# Patient Record
Sex: FEMALE | Race: BLACK OR AFRICAN AMERICAN | NOT HISPANIC OR LATINO | URBAN - METROPOLITAN AREA
[De-identification: names, ages, dates, MRNs, and addresses within clinical notes are randomized per-mention and may not be internally consistent; named-entity substitution may affect disease eponyms.]

---

## 2018-05-19 ENCOUNTER — INPATIENT (INPATIENT)
Facility: HOSPITAL | Age: 78
LOS: 2 days | Discharge: HOME | End: 2018-05-22
Attending: EMERGENCY MEDICINE | Admitting: EMERGENCY MEDICINE
Payer: MEDICARE

## 2018-05-19 VITALS
HEIGHT: 59 IN | SYSTOLIC BLOOD PRESSURE: 117 MMHG | WEIGHT: 119.93 LBS | OXYGEN SATURATION: 94 % | RESPIRATION RATE: 17 BRPM | HEART RATE: 73 BPM | TEMPERATURE: 98 F | DIASTOLIC BLOOD PRESSURE: 66 MMHG

## 2018-05-19 LAB
ALBUMIN SERPL ELPH-MCNC: 3.7 G/DL — SIGNIFICANT CHANGE UP (ref 3.3–5)
ALP SERPL-CCNC: 42 U/L — SIGNIFICANT CHANGE UP (ref 40–120)
ALT FLD-CCNC: 21 U/L — SIGNIFICANT CHANGE UP (ref 12–78)
ANION GAP SERPL CALC-SCNC: 11 MMOL/L — SIGNIFICANT CHANGE UP (ref 5–17)
APPEARANCE UR: CLEAR — SIGNIFICANT CHANGE UP
APTT BLD: 22.1 SEC — LOW (ref 27.5–37.4)
AST SERPL-CCNC: 17 U/L — SIGNIFICANT CHANGE UP (ref 15–37)
BASOPHILS # BLD AUTO: 0.07 K/UL — SIGNIFICANT CHANGE UP (ref 0–0.2)
BASOPHILS NFR BLD AUTO: 0.7 % — SIGNIFICANT CHANGE UP (ref 0–2)
BILIRUB SERPL-MCNC: 0.6 MG/DL — SIGNIFICANT CHANGE UP (ref 0.2–1.2)
BILIRUB UR-MCNC: NEGATIVE — SIGNIFICANT CHANGE UP
BUN SERPL-MCNC: 20 MG/DL — SIGNIFICANT CHANGE UP (ref 7–23)
CALCIUM SERPL-MCNC: 9.3 MG/DL — SIGNIFICANT CHANGE UP (ref 8.5–10.1)
CHLORIDE SERPL-SCNC: 102 MMOL/L — SIGNIFICANT CHANGE UP (ref 96–108)
CO2 SERPL-SCNC: 29 MMOL/L — SIGNIFICANT CHANGE UP (ref 22–31)
COLOR SPEC: YELLOW — SIGNIFICANT CHANGE UP
CREAT SERPL-MCNC: 0.98 MG/DL — SIGNIFICANT CHANGE UP (ref 0.5–1.3)
DIFF PNL FLD: (no result)
EOSINOPHIL # BLD AUTO: 0.1 K/UL — SIGNIFICANT CHANGE UP (ref 0–0.5)
EOSINOPHIL NFR BLD AUTO: 1 % — SIGNIFICANT CHANGE UP (ref 0–6)
GLUCOSE BLDC GLUCOMTR-MCNC: 135 MG/DL — HIGH (ref 70–99)
GLUCOSE SERPL-MCNC: 202 MG/DL — HIGH (ref 70–99)
GLUCOSE UR QL: NEGATIVE MG/DL — SIGNIFICANT CHANGE UP
HCT VFR BLD CALC: 42.7 % — SIGNIFICANT CHANGE UP (ref 34.5–45)
HGB BLD-MCNC: 13.9 G/DL — SIGNIFICANT CHANGE UP (ref 11.5–15.5)
IMM GRANULOCYTES NFR BLD AUTO: 0.7 % — SIGNIFICANT CHANGE UP (ref 0–1.5)
INR BLD: 0.99 RATIO — SIGNIFICANT CHANGE UP (ref 0.88–1.16)
KETONES UR-MCNC: NEGATIVE — SIGNIFICANT CHANGE UP
LEUKOCYTE ESTERASE UR-ACNC: (no result)
LYMPHOCYTES # BLD AUTO: 1.44 K/UL — SIGNIFICANT CHANGE UP (ref 1–3.3)
LYMPHOCYTES # BLD AUTO: 14.8 % — SIGNIFICANT CHANGE UP (ref 13–44)
MCHC RBC-ENTMCNC: 28.8 PG — SIGNIFICANT CHANGE UP (ref 27–34)
MCHC RBC-ENTMCNC: 32.6 GM/DL — SIGNIFICANT CHANGE UP (ref 32–36)
MCV RBC AUTO: 88.4 FL — SIGNIFICANT CHANGE UP (ref 80–100)
MONOCYTES # BLD AUTO: 0.5 K/UL — SIGNIFICANT CHANGE UP (ref 0–0.9)
MONOCYTES NFR BLD AUTO: 5.2 % — SIGNIFICANT CHANGE UP (ref 2–14)
NEUTROPHILS # BLD AUTO: 7.52 K/UL — HIGH (ref 1.8–7.4)
NEUTROPHILS NFR BLD AUTO: 77.6 % — HIGH (ref 43–77)
NITRITE UR-MCNC: NEGATIVE — SIGNIFICANT CHANGE UP
NRBC # BLD: 0 /100 WBCS — SIGNIFICANT CHANGE UP (ref 0–0)
PH UR: 6 — SIGNIFICANT CHANGE UP (ref 5–8)
PLATELET # BLD AUTO: 286 K/UL — SIGNIFICANT CHANGE UP (ref 150–400)
POTASSIUM SERPL-MCNC: 3.5 MMOL/L — SIGNIFICANT CHANGE UP (ref 3.5–5.3)
POTASSIUM SERPL-SCNC: 3.5 MMOL/L — SIGNIFICANT CHANGE UP (ref 3.5–5.3)
PROT SERPL-MCNC: 7.6 GM/DL — SIGNIFICANT CHANGE UP (ref 6–8.3)
PROT UR-MCNC: NEGATIVE MG/DL — SIGNIFICANT CHANGE UP
PROTHROM AB SERPL-ACNC: 10.7 SEC — SIGNIFICANT CHANGE UP (ref 9.8–12.7)
RBC # BLD: 4.83 M/UL — SIGNIFICANT CHANGE UP (ref 3.8–5.2)
RBC # FLD: 12.7 % — SIGNIFICANT CHANGE UP (ref 10.3–14.5)
SODIUM SERPL-SCNC: 142 MMOL/L — SIGNIFICANT CHANGE UP (ref 135–145)
SP GR SPEC: 1.02 — SIGNIFICANT CHANGE UP (ref 1.01–1.02)
TROPONIN I SERPL-MCNC: <0.015 NG/ML — SIGNIFICANT CHANGE UP (ref 0.01–0.04)
TROPONIN I SERPL-MCNC: <0.015 NG/ML — SIGNIFICANT CHANGE UP (ref 0.01–0.04)
UROBILINOGEN FLD QL: NEGATIVE MG/DL — SIGNIFICANT CHANGE UP
WBC # BLD: 9.7 K/UL — SIGNIFICANT CHANGE UP (ref 3.8–10.5)
WBC # FLD AUTO: 9.7 K/UL — SIGNIFICANT CHANGE UP (ref 3.8–10.5)

## 2018-05-19 PROCEDURE — 74176 CT ABD & PELVIS W/O CONTRAST: CPT | Mod: 26

## 2018-05-19 PROCEDURE — 99285 EMERGENCY DEPT VISIT HI MDM: CPT

## 2018-05-19 PROCEDURE — 93010 ELECTROCARDIOGRAM REPORT: CPT

## 2018-05-19 PROCEDURE — 71045 X-RAY EXAM CHEST 1 VIEW: CPT | Mod: 26

## 2018-05-19 RX ORDER — ACETAMINOPHEN 500 MG
650 TABLET ORAL EVERY 6 HOURS
Qty: 0 | Refills: 0 | Status: DISCONTINUED | OUTPATIENT
Start: 2018-05-19 | End: 2018-05-22

## 2018-05-19 RX ORDER — ASPIRIN/CALCIUM CARB/MAGNESIUM 324 MG
325 TABLET ORAL ONCE
Qty: 0 | Refills: 0 | Status: COMPLETED | OUTPATIENT
Start: 2018-05-19 | End: 2018-05-19

## 2018-05-19 RX ORDER — DEXTROSE 50 % IN WATER 50 %
12.5 SYRINGE (ML) INTRAVENOUS ONCE
Qty: 0 | Refills: 0 | Status: DISCONTINUED | OUTPATIENT
Start: 2018-05-19 | End: 2018-05-20

## 2018-05-19 RX ORDER — HEPARIN SODIUM 5000 [USP'U]/ML
5000 INJECTION INTRAVENOUS; SUBCUTANEOUS EVERY 8 HOURS
Qty: 0 | Refills: 0 | Status: DISCONTINUED | OUTPATIENT
Start: 2018-05-19 | End: 2018-05-22

## 2018-05-19 RX ORDER — SENNA PLUS 8.6 MG/1
2 TABLET ORAL AT BEDTIME
Qty: 0 | Refills: 0 | Status: DISCONTINUED | OUTPATIENT
Start: 2018-05-19 | End: 2018-05-19

## 2018-05-19 RX ORDER — DEXTROSE 50 % IN WATER 50 %
15 SYRINGE (ML) INTRAVENOUS ONCE
Qty: 0 | Refills: 0 | Status: DISCONTINUED | OUTPATIENT
Start: 2018-05-19 | End: 2018-05-20

## 2018-05-19 RX ORDER — GLUCAGON INJECTION, SOLUTION 0.5 MG/.1ML
1 INJECTION, SOLUTION SUBCUTANEOUS ONCE
Qty: 0 | Refills: 0 | Status: DISCONTINUED | OUTPATIENT
Start: 2018-05-19 | End: 2018-05-20

## 2018-05-19 RX ORDER — SODIUM CHLORIDE 9 MG/ML
1000 INJECTION, SOLUTION INTRAVENOUS
Qty: 0 | Refills: 0 | Status: DISCONTINUED | OUTPATIENT
Start: 2018-05-19 | End: 2018-05-20

## 2018-05-19 RX ORDER — POLYETHYLENE GLYCOL 3350 17 G/17G
17 POWDER, FOR SOLUTION ORAL DAILY
Qty: 0 | Refills: 0 | Status: DISCONTINUED | OUTPATIENT
Start: 2018-05-19 | End: 2018-05-22

## 2018-05-19 RX ORDER — SODIUM CHLORIDE 9 MG/ML
3 INJECTION INTRAMUSCULAR; INTRAVENOUS; SUBCUTANEOUS ONCE
Qty: 0 | Refills: 0 | Status: COMPLETED | OUTPATIENT
Start: 2018-05-19 | End: 2018-05-19

## 2018-05-19 RX ORDER — DEXTROSE 50 % IN WATER 50 %
25 SYRINGE (ML) INTRAVENOUS ONCE
Qty: 0 | Refills: 0 | Status: DISCONTINUED | OUTPATIENT
Start: 2018-05-19 | End: 2018-05-20

## 2018-05-19 RX ORDER — DOCUSATE SODIUM 100 MG
100 CAPSULE ORAL THREE TIMES A DAY
Qty: 0 | Refills: 0 | Status: DISCONTINUED | OUTPATIENT
Start: 2018-05-19 | End: 2018-05-19

## 2018-05-19 RX ORDER — SODIUM CHLORIDE 9 MG/ML
1500 INJECTION INTRAMUSCULAR; INTRAVENOUS; SUBCUTANEOUS
Qty: 0 | Refills: 0 | Status: DISCONTINUED | OUTPATIENT
Start: 2018-05-19 | End: 2018-05-20

## 2018-05-19 RX ORDER — ASPIRIN/CALCIUM CARB/MAGNESIUM 324 MG
81 TABLET ORAL DAILY
Qty: 0 | Refills: 0 | Status: DISCONTINUED | OUTPATIENT
Start: 2018-05-19 | End: 2018-05-22

## 2018-05-19 RX ADMIN — SODIUM CHLORIDE 3 MILLILITER(S): 9 INJECTION INTRAMUSCULAR; INTRAVENOUS; SUBCUTANEOUS at 16:43

## 2018-05-19 RX ADMIN — Medication 325 MILLIGRAM(S): at 16:50

## 2018-05-19 NOTE — ED ADULT NURSE NOTE - OBJECTIVE STATEMENT
presents to the ED with abd pain and near syncope at home. as per pt, she has abd pain, denies nausea, vomiting, fevers, chills. pt satin the chair because of the abd pain, dizziness and diaphoresis. denies chest pain, SOB,

## 2018-05-19 NOTE — H&P ADULT - NSHPPHYSICALEXAM_GEN_ALL_CORE
Vital Signs Last 24 Hrs  T(C): 36.6 (19 May 2018 16:07), Max: 36.6 (19 May 2018 16:07)  T(F): 97.9 (19 May 2018 16:07), Max: 97.9 (19 May 2018 16:07)  HR: 70 (19 May 2018 20:33) (70 - 74)  BP: 116/87 (19 May 2018 20:33) (115/59 - 117/66)  RR: 18 (19 May 2018 20:33) (16 - 18)  SpO2: 97% (19 May 2018 20:33) (94% - 97%)

## 2018-05-19 NOTE — ED PROVIDER NOTE - CARDIAC, MLM
Normal rate, regular rhythm.  Heart sounds S1, S2. Normal rate, regular rhythm.  Heart sounds S1, S2. No gallops or rubs.

## 2018-05-19 NOTE — H&P ADULT - NSHPOUTPATIENTPROVIDERS_GEN_ALL_CORE
PCP/Cardio: Pt reported that she goes to see  from [facility] on [address] but does not remember name

## 2018-05-19 NOTE — ED PROVIDER NOTE - PROGRESS NOTE DETAILS
Catrachito Sorto on behalf of Dr. Sae Gilmore: Updated patient on results and answered any questions or concerns.

## 2018-05-19 NOTE — ED PROVIDER NOTE - NS_ ATTENDINGSCRIBEDETAILS _ED_A_ED_FT
I Sae Gilmore MD saw and examined the patient. Scribe documented for me and under my supervision. I have modified the scribe's documentation where necessary to reflect my history, physical exam and other relevant documentations pertinent to the care of the patient.

## 2018-05-19 NOTE — ED PROVIDER NOTE - NEUROLOGICAL, MLM
Alert and oriented, no focal deficits, no motor or sensory deficits. Alert and oriented, no focal deficits, no motor or sensory deficits. NIH=0 GCS=15. CN 2-12 grossly intact. No finger to nose movement issues. Intact proprioception.

## 2018-05-19 NOTE — ED PROVIDER NOTE - CHPI ED SYMPTOMS POS
+diaphoresis, +lightheadedness, +near syncope/PALPITATIONS +diaphoresis, +lightheadedness, +near syncope, +abd pain/PALPITATIONS

## 2018-05-19 NOTE — H&P ADULT - NEUROLOGICAL DETAILS
deep reflexes intact/sensation intact/cranial nerves intact/normal strength/responds to verbal commands/alert and oriented x 3/responds to pain

## 2018-05-19 NOTE — ED PROVIDER NOTE - OBJECTIVE STATEMENT
79 y/o Female with a PMHx of HTN, HLD, DM type 2 presents to ED BIBEMS s/p near syncope. Pt states she ate salty pizza after which she walked to [employer] where she felt lightheaded, diaphoretic, and had to sit down. +Palpitations. Pt states she nearly syncopized, but did not lose consciousness. Pt then walked back to a [facility] center who then called EMS. No vaginal bleeding, no melena, no hematochezia, no hematuria, no rectal bleeding. No recent surgeries. No hx of CAD, CABG, stents, MI, CVA. 77 y/o Female with a PMHx of HTN, HLD, DM type 2 presents to ED BIBEMS s/p near syncope. Pt states she ate salty pizza after which she walked to [employer] where she felt lightheaded, diaphoretic, and had to sit down. +Palpitations. Pt also notes of +abd pain prior to episode. Pt states she nearly syncopized, but did not lose consciousness. Pt then walked back to a [facility] center who then called EMS. No vaginal bleeding, no melena, no hematochezia, no hematuria, no rectal bleeding. No recent surgeries. No hx of CAD, CABG, stents, MI, CVA. 79 y/o Female with a PMHx of HTN, HLD, DM type 2 presents to ED BIBEMS s/p near syncope. Pt states she ate salty pizza after which she walked to [employer] where she felt lightheaded, diaphoretic, and had to sit down. +Palpitations. Pt also notes of +abd pain prior to episode. Pt states she nearly synopsized, but did not lose consciousness. Pt then walked back to a [facility] center who then called EMS. No vaginal bleeding, no melena, no hematochezia, no hematuria, no rectal bleeding. No recent surgeries. No hx of CAD, CABG, stents, MI, CVA. No fever or neck stiffness.

## 2018-05-19 NOTE — ED PROVIDER NOTE - MUSCULOSKELETAL, MLM
Spine appears normal, range of motion is not limited, no muscle or joint tenderness Spine appears normal, range of motion is not limited, no muscle or joint tenderness. 5/5 strength on flexion and extension. No nuchal rigidity.

## 2018-05-19 NOTE — H&P ADULT - HISTORY OF PRESENT ILLNESS
77 yo F with PMH significant for HTN, HLD, DM2 presents to the ED c/o near syncope. Pt reported that she was at [employer] and started to feel dizzy, hot, diaphoretic so sits down on bench. Denies LOC. Denies chest pain, headache, palpitation or SOB. Denies tingling or numbness. Pt reported that she had Abdominal pain prior to near syncope which is chronic pain and she gets once or twice dull abdominal pain. Pt reported that abdominal pain is mostly related to constipation. Pt states that she had colonoscopy done last year and was reported normal as per patient. Pt reported that abdominal pain resolved now. Denies N/V/D. C/o constipation.     Pt denies any PMH of CAD, MI, CVA or TIA.     In the ED, BUN/Cr: 20/0.98, Trop 1: < 0.015  CTAP:  inguinal hernia containing a knuckle of nonobstructive small bowel. No hydronephrosis. No bowel obstruction or gross bowel wall thickening. Normal appendix. Colonic diverticulosis without definite evidence of diverticulitis.

## 2018-05-19 NOTE — ED PROVIDER NOTE - GASTROINTESTINAL, MLM
Abdomen soft, non-tender, no guarding. diffusely tender on palpation of abd. Abdomen soft, no guarding. diffusely tender on palpation of abd. Abdomen soft, no guarding. BS+/ 4 quadrants.

## 2018-05-19 NOTE — H&P ADULT - ASSESSMENT
79 yo F with PMH significant for HTN, HLD, DM2 presents to the ED c/o near syncope.    # Near Syncope: r/o ACS, 2/2 to Dehydration - Vasovagal syncope?  - Admit to Telemetry  - HAO panel  - Serial EKG  - Cardio consult  - IVF NS gentle hydration  - Orthostatic vitals  - Fall precaution  - Neuro check  - ASA daily    # CTAP: inguinal hernia containing a knuckle of nonobstructive small bowel/ Enterocele/ Colonic Diverticulosis/ Subcm mesenteric LN  - Abdominal pain resolved now, abdomen is benign on exam  - Laxatives prn  - Outpt f/u with PCP after discharge for further eval  - If abdominal pain recurs consider GI consult and further inpatient w/u    # HTN  - c/w Enalapril  - Hold on HCTZ - IVF NS ordered  - Monitor BP    # HLD  - Lipid panel  - not on meds at home    # DM2  - Fingerstick check  - HBA1C  - Pt reported that she does not take any  meds for DM2, controlled by diet mostly    # DVT Ppx  - HSQ    # Pt reported that she takes Enalapril-HCTZ at home for HTN but does not remember dose. Pt also reported that she take one white pill at home but does  not remember name. Verify meds in AM.     Case d/w  77 yo F with PMH significant for HTN, HLD, DM2 presents to the ED c/o near syncope.    # Pre Syncope: r/o ACS, 2/2 to Dehydration - Vasovagal syncope?  - Admit to Telemetry  - HAO panel  - Serial EKG  - Cardio consult  - IVF NS gentle hydration  - Orthostatic vitals  - Fall precaution  - Neuro check  - ASA daily    # CTAP: inguinal hernia containing a knuckle of nonobstructive small bowel/ Enterocele/ Colonic Diverticulosis/ Subcm mesenteric LN  - Abdominal pain resolved now, abdomen is benign on exam  - Laxatives prn  - Outpt f/u with PCP after discharge for further eval  - If abdominal pain recurs consider GI consult and further inpatient w/u    # HTN  - c/w Enalapril  - Hold on HCTZ - IVF NS ordered  - Monitor BP    # HLD  - Lipid panel  - not on meds at home    # DM2  - Fingerstick check  - HBA1C  - Pt reported that she does not take any  meds for DM2, controlled by diet mostly    # DVT Ppx  - HSQ    # Pt reported that she takes Enalapril-HCTZ at home for HTN but does not remember dose. Pt also reported that she take one white pill at home but does  not remember name. Verify meds in AM.     Case d/w  77 yo F with PMH significant for HTN, HLD, DM2 presents to the ED c/o near syncope.    # Pre Syncope: r/o ACS, 2/2 to Dehydration - Vasovagal syncope?  - Admit to Telemetry  - HAO panel  - Serial EKG  - Cardio consult  - IVF NS gentle hydration  - Orthostatic vitals  - Fall precaution  - Neuro check  - ASA daily    # CTAP: inguinal hernia containing a knuckle of nonobstructive small bowel/ Enterocele/ Colonic Diverticulosis/ Subcm mesenteric LN  - Abdominal pain resolved now, abdomen is benign on exam  - Laxatives prn  - Outpt f/u with PCP after discharge for further eval  - If abdominal pain recurs consider GI consult and further inpatient w/u    # HTN  - c/w Enalapril  - Hold on HCTZ - IVF NS ordered  - Monitor BP    # HLD  - Lipid panel  - not on meds at home    # DM2  - Fingerstick check  - HBA1C  - Pt reported that she does not take any  meds for DM2, controlled by diet mostly    # DVT Ppx  - HSQ    # Pt reported that she takes Enalapril-HCTZ at home for HTN but does not remember dose. Pt also reported that she take one white pill at home but does  not remember name. Verify meds in AM.     Case d/w Dr. Warren

## 2018-05-19 NOTE — H&P ADULT - MUSCULOSKELETAL
negative No joint pain, swelling or deformity; no limitation of movement details… detailed exam no joint erythema/no joint warmth

## 2018-05-19 NOTE — H&P ADULT - ATTENDING COMMENTS
79 yo F with PMH significant for HTN, HLD, DM2 presents to the ED c/o near syncope. Pt reported that she was at [employer] and started to feel dizzy, hot, diaphoretic so sits down on bench. Denies LOC. Denies chest pain, headache, palpitation or SOB. Denies tingling or numbness. Pt reported that she had Abdominal pain prior to near syncope which is chronic pain and she gets once or twice dull abdominal pain. Pt reported that abdominal pain is mostly related to constipation. Pt states that she had colonoscopy done last year and was reported normal as per patient. Pt reported that abdominal pain resolved now. Denies N/V/D. C/o constipation.   ROS: as above.  On exam: as above.  A/P:    # Pre Syncope: r/o ACS, 2/2 to Dehydration - Vasovagal syncope?  - Admit to Telemetry  - HAO panel  - Serial EKG  - Cardio consult  - IVF NS gentle hydration  - Orthostatic vitals  - Fall precaution  - Neuro check  - ASA daily    # CTAP: inguinal hernia containing a knuckle of nonobstructive small bowel/ Enterocele/ Colonic Diverticulosis/ Subcm mesenteric LN  - Abdominal pain resolved now, abdomen is benign on exam  - Laxatives prn  - Outpt f/u with PCP after discharge for further eval  - If abdominal pain recurs consider GI consult and further inpatient w/u    # HTN  - c/w Enalapril  - Hold on HCTZ - IVF NS ordered  - Monitor BP    # HLD  - Lipid panel  - not on meds at home    # DM2  - Fingerstick check  - HBA1C  - Pt reported that she does not take any  meds for DM2, controlled by diet mostly    # DVT Ppx  - HSQ    # Pt reported that she takes Enalapril-HCTZ at home for HTN but does not remember dose. Pt also reported that she take one white pill at home but does  not remember name. Verify meds in AM.

## 2018-05-20 LAB
ADD ON TEST-SPECIMEN IN LAB: SIGNIFICANT CHANGE UP
ANION GAP SERPL CALC-SCNC: 8 MMOL/L — SIGNIFICANT CHANGE UP (ref 5–17)
APTT BLD: 25.8 SEC — LOW (ref 27.5–37.4)
BACTERIA # UR AUTO: (no result)
BASOPHILS # BLD AUTO: 0.04 K/UL — SIGNIFICANT CHANGE UP (ref 0–0.2)
BASOPHILS NFR BLD AUTO: 0.6 % — SIGNIFICANT CHANGE UP (ref 0–2)
BUN SERPL-MCNC: 17 MG/DL — SIGNIFICANT CHANGE UP (ref 7–23)
CALCIUM SERPL-MCNC: 8.6 MG/DL — SIGNIFICANT CHANGE UP (ref 8.5–10.1)
CHLORIDE SERPL-SCNC: 104 MMOL/L — SIGNIFICANT CHANGE UP (ref 96–108)
CHOLEST SERPL-MCNC: 148 MG/DL — SIGNIFICANT CHANGE UP (ref 10–199)
CO2 SERPL-SCNC: 28 MMOL/L — SIGNIFICANT CHANGE UP (ref 22–31)
CREAT SERPL-MCNC: 0.67 MG/DL — SIGNIFICANT CHANGE UP (ref 0.5–1.3)
EOSINOPHIL # BLD AUTO: 0.17 K/UL — SIGNIFICANT CHANGE UP (ref 0–0.5)
EOSINOPHIL NFR BLD AUTO: 2.6 % — SIGNIFICANT CHANGE UP (ref 0–6)
EPI CELLS # UR: SIGNIFICANT CHANGE UP
GLUCOSE BLDC GLUCOMTR-MCNC: 117 MG/DL — HIGH (ref 70–99)
GLUCOSE BLDC GLUCOMTR-MCNC: 162 MG/DL — HIGH (ref 70–99)
GLUCOSE SERPL-MCNC: 129 MG/DL — HIGH (ref 70–99)
HBA1C BLD-MCNC: 5.9 % — HIGH (ref 4–5.6)
HCT VFR BLD CALC: 37.7 % — SIGNIFICANT CHANGE UP (ref 34.5–45)
HDLC SERPL-MCNC: 42 MG/DL — SIGNIFICANT CHANGE UP (ref 40–125)
HGB BLD-MCNC: 12 G/DL — SIGNIFICANT CHANGE UP (ref 11.5–15.5)
IMM GRANULOCYTES NFR BLD AUTO: 0.3 % — SIGNIFICANT CHANGE UP (ref 0–1.5)
INR BLD: 1.06 RATIO — SIGNIFICANT CHANGE UP (ref 0.88–1.16)
LIPID PNL WITH DIRECT LDL SERPL: 71 MG/DL — SIGNIFICANT CHANGE UP
LYMPHOCYTES # BLD AUTO: 1.92 K/UL — SIGNIFICANT CHANGE UP (ref 1–3.3)
LYMPHOCYTES # BLD AUTO: 29.6 % — SIGNIFICANT CHANGE UP (ref 13–44)
MAGNESIUM SERPL-MCNC: 2.1 MG/DL — SIGNIFICANT CHANGE UP (ref 1.6–2.6)
MCHC RBC-ENTMCNC: 27.8 PG — SIGNIFICANT CHANGE UP (ref 27–34)
MCHC RBC-ENTMCNC: 31.8 GM/DL — LOW (ref 32–36)
MCV RBC AUTO: 87.5 FL — SIGNIFICANT CHANGE UP (ref 80–100)
MONOCYTES # BLD AUTO: 0.52 K/UL — SIGNIFICANT CHANGE UP (ref 0–0.9)
MONOCYTES NFR BLD AUTO: 8 % — SIGNIFICANT CHANGE UP (ref 2–14)
NEUTROPHILS # BLD AUTO: 3.81 K/UL — SIGNIFICANT CHANGE UP (ref 1.8–7.4)
NEUTROPHILS NFR BLD AUTO: 58.9 % — SIGNIFICANT CHANGE UP (ref 43–77)
NRBC # BLD: 0 /100 WBCS — SIGNIFICANT CHANGE UP (ref 0–0)
PHOSPHATE SERPL-MCNC: 3.6 MG/DL — SIGNIFICANT CHANGE UP (ref 2.5–4.5)
PLATELET # BLD AUTO: 271 K/UL — SIGNIFICANT CHANGE UP (ref 150–400)
POTASSIUM SERPL-MCNC: 3.6 MMOL/L — SIGNIFICANT CHANGE UP (ref 3.5–5.3)
POTASSIUM SERPL-SCNC: 3.6 MMOL/L — SIGNIFICANT CHANGE UP (ref 3.5–5.3)
PROTHROM AB SERPL-ACNC: 11.5 SEC — SIGNIFICANT CHANGE UP (ref 9.8–12.7)
RBC # BLD: 4.31 M/UL — SIGNIFICANT CHANGE UP (ref 3.8–5.2)
RBC # FLD: 12.9 % — SIGNIFICANT CHANGE UP (ref 10.3–14.5)
RBC CASTS # UR COMP ASSIST: (no result) /HPF (ref 0–4)
SODIUM SERPL-SCNC: 140 MMOL/L — SIGNIFICANT CHANGE UP (ref 135–145)
TOTAL CHOLESTEROL/HDL RATIO MEASUREMENT: 3.5 RATIO — SIGNIFICANT CHANGE UP (ref 3.3–7.1)
TRIGL SERPL-MCNC: 176 MG/DL — HIGH (ref 10–149)
TSH SERPL-MCNC: 0.82 UIU/ML — SIGNIFICANT CHANGE UP (ref 0.36–3.74)
WBC # BLD: 6.48 K/UL — SIGNIFICANT CHANGE UP (ref 3.8–10.5)
WBC # FLD AUTO: 6.48 K/UL — SIGNIFICANT CHANGE UP (ref 3.8–10.5)
WBC UR QL: (no result)

## 2018-05-20 PROCEDURE — 93010 ELECTROCARDIOGRAM REPORT: CPT

## 2018-05-20 RX ORDER — SODIUM CHLORIDE 9 MG/ML
1000 INJECTION INTRAMUSCULAR; INTRAVENOUS; SUBCUTANEOUS
Qty: 0 | Refills: 0 | Status: DISCONTINUED | OUTPATIENT
Start: 2018-05-20 | End: 2018-05-22

## 2018-05-20 RX ORDER — ENALAPRIL MALEATE AND HYDROCHLOROTHIAZIDE 10; 25 MG/1; MG/1
0 TABLET ORAL
Qty: 0 | Refills: 0 | COMMUNITY

## 2018-05-20 RX ORDER — CEFTRIAXONE 500 MG/1
2000 INJECTION, POWDER, FOR SOLUTION INTRAMUSCULAR; INTRAVENOUS EVERY 24 HOURS
Qty: 0 | Refills: 0 | Status: DISCONTINUED | OUTPATIENT
Start: 2018-05-21 | End: 2018-05-22

## 2018-05-20 RX ORDER — CEFTRIAXONE 500 MG/1
2000 INJECTION, POWDER, FOR SOLUTION INTRAMUSCULAR; INTRAVENOUS ONCE
Qty: 0 | Refills: 0 | Status: COMPLETED | OUTPATIENT
Start: 2018-05-20 | End: 2018-05-20

## 2018-05-20 RX ORDER — CEFTRIAXONE 500 MG/1
INJECTION, POWDER, FOR SOLUTION INTRAMUSCULAR; INTRAVENOUS
Qty: 0 | Refills: 0 | Status: DISCONTINUED | OUTPATIENT
Start: 2018-05-20 | End: 2018-05-20

## 2018-05-20 RX ORDER — CEFTRIAXONE 500 MG/1
INJECTION, POWDER, FOR SOLUTION INTRAMUSCULAR; INTRAVENOUS
Qty: 0 | Refills: 0 | Status: DISCONTINUED | OUTPATIENT
Start: 2018-05-20 | End: 2018-05-22

## 2018-05-20 RX ADMIN — Medication 81 MILLIGRAM(S): at 12:34

## 2018-05-20 RX ADMIN — HEPARIN SODIUM 5000 UNIT(S): 5000 INJECTION INTRAVENOUS; SUBCUTANEOUS at 15:06

## 2018-05-20 RX ADMIN — HEPARIN SODIUM 5000 UNIT(S): 5000 INJECTION INTRAVENOUS; SUBCUTANEOUS at 21:56

## 2018-05-20 RX ADMIN — CEFTRIAXONE 2000 MILLIGRAM(S): 500 INJECTION, POWDER, FOR SOLUTION INTRAMUSCULAR; INTRAVENOUS at 15:06

## 2018-05-20 RX ADMIN — HEPARIN SODIUM 5000 UNIT(S): 5000 INJECTION INTRAVENOUS; SUBCUTANEOUS at 05:41

## 2018-05-20 RX ADMIN — SODIUM CHLORIDE 100 MILLILITER(S): 9 INJECTION INTRAMUSCULAR; INTRAVENOUS; SUBCUTANEOUS at 01:40

## 2018-05-20 RX ADMIN — Medication 1 TABLET(S): at 12:34

## 2018-05-20 RX ADMIN — SODIUM CHLORIDE 75 MILLILITER(S): 9 INJECTION INTRAMUSCULAR; INTRAVENOUS; SUBCUTANEOUS at 16:50

## 2018-05-20 NOTE — PROGRESS NOTE ADULT - ASSESSMENT
77 yo F with PMH significant for HTN, HLD, DM2 presents to the ED c/o near syncope.    # Pre Syncope ruled out ACS, due to Dehydration, vasovagal etiology   - Admit to Telemetry  - HAO panel x2 neg, repeat in am   - Serial EKG  - Cardio consult  - IVF NS gentle hydration  - Orthostatic vitals  - Fall precaution  - Neuro check  - ASA daily    # Abdominal pain due to UTI  # Chronic constipation  - CTAP: inguinal hernia containing a knuckle of nonobstructive small bowel/ Enterocele/ Colonic Diverticulosis/ Subcm mesenteric LN  - UA - pyuria, dysuria +  - start IV ceftriaxone   - f/u culture   - Laxatives prn  - Outpt f/u with PCP after discharge for further eval  - If abdominal pain recurs consider GI consult and further inpatient w/u    # HTN  - c/w Enalapril  - Hold on HCTZ - IVF NS ordered  - Monitor BP    # HLD  - Lipid panel LDL at goal  - not on meds at home    #h/o DM, diet controlled, in prediabetic range   - Fingerstick check - d/c  - HBA1C - 5.9   - Pt reported that she does not take any  meds for DM2, controlled by diet mostly    # DVT Ppx  - HSQ

## 2018-05-20 NOTE — PROGRESS NOTE ADULT - SUBJECTIVE AND OBJECTIVE BOX
Subjective:  Patient is a 78y old  Female who presents with a chief complaint of c/o Near syncope      HPI:     77 yo F with PMH significant for HTN, HLD, DM2 presents to the ED on 18  s/p near syncope. Pt reported that she was at [employer] and started to feel dizzy, hot, diaphoretic so sits down on bench. Denies LOC. Denies chest pain, headache, palpitation or SOB. Denies tingling or numbness. Pt reported that she had Abdominal pain prior to near syncope which is chronic pain and she gets once or twice dull abdominal pain. Pt reported that abdominal pain is mostly related to constipation. Pt states that she had colonoscopy done last year and was reported normal as per patient. Pt reported that abdominal pain resolved now. Denies N/V/D. C/o constipation.   Pt denies any PMH of CAD, MI, CVA or TIA.  In the ED, BUN/Cr: 20/0.98, Trop 1: < 0.015  CTAP:  inguinal hernia containing a knuckle of nonobstructive small bowel. No hydronephrosis. No bowel obstruction or gross bowel wall thickening. Normal appendix. Colonic diverticulosis without definite evidence of diverticulitis.      - Patient seen and examined at bedside earlier today, dizziness resolved, reports lower abd pain, dysuria, UA - pyuria noted, reports h/o  recent UTI treated with Abx, afebrile, denies HA, numbness, CP, no events overnight    Review of system- Rest of the review of system are negative except mentioned in HPI    OBJECTIVE:   T(C): 36.7 (18 @ 10:41), Max: 36.7 (18 @ 10:41)  T(F): 98.1 (18 @ 10:41), Max: 98.1 (18 @ 10:41)  HR: 67 (-18 @ 10:41) (62 - 74)  BP: 112/49 (-18 @ 10:41) (106/52 - 117/66)  RR: 16 (18 @ 10:41) (14 - 18)  SpO2: 97% (18 @ 10:41) (94% - 97%)  Wt(kg): --  Daily Height in cm: 149.86 (19 May 2018 16:07)    Daily Weight in k.8 (19 May 2018 23:42)    PHYSICAL EXAM:  GENERAL: NAD  NERVOUS SYSTEM:  Alert & Oriented X3, non- focal exam,  HEAD:  Atraumatic, Normocephalic  EYES: EOMI, PERRLA, conjunctiva and sclera clear  HEENT: Moist mucous membranes  NECK: Supple, No JVD  CHEST/LUNG: Clear to auscultation bilaterally; No rales, no rhonchi, no wheezing, or rubs  HEART: Regular rate and rhythm; No murmurs, rubs, or gallops  ABDOMEN: Soft, suprapubic tenderness,  Nondistended; Bowel sounds present, no guarding, no peritoneal signs  GENITOURINARY- Voiding, no suprapubic tenderness  EXTREMITIES:  2+ Peripheral Pulses, No clubbing, cyanosis, or edema  MUSCULOSKELETAL:- No muscle tenderness, Muscle tone normal, No joint tenderness, no Joint swelling, Joint range of motion-normal  SKIN-no rash, no lesion    LABS:                        12.0   6.48  )-----------( 271      ( 20 May 2018 06:37 )             37.7     05-20    140  |  104  |  17  ----------------------------<  129<H>  3.6   |  28  |  0.67    Ca    8.6      20 May 2018 06:37  Phos  3.6     05-20  Mg     2.1     05-20    TPro  7.6  /  Alb  3.7  /  TBili  0.6  /  DBili  x   /  AST  17  /  ALT  21  /  AlkPhos  42  05-19    PT/INR - ( 20 May 2018 06:37 )   PT: 11.5 sec;   INR: 1.06 ratio         PTT - ( 20 May 2018 06:37 )  PTT:25.8 sec  CARDIAC MARKERS ( 19 May 2018 22:05 )  <0.015 ng/mL / x     / x     / x     / x      CARDIAC MARKERS ( 19 May 2018 16:41 )  <0.015 ng/mL / x     / x     / x     / x      Urinalysis Basic - ( 19 May 2018 23:40 )    Color: Yellow / Appearance: Clear / S.020 / pH: x  Gluc: x / Ketone: Negative  / Bili: Negative / Urobili: Negative mg/dL   Blood: x / Protein: Negative mg/dL / Nitrite: Negative   Leuk Esterase: Moderate / RBC: 3-5 /HPF / WBC 26-50   Sq Epi: x / Non Sq Epi: Few / Bacteria: Few  CAPILLARY BLOOD GLUCOSE  POCT Blood Glucose.: 117 mg/dL (20 May 2018 12:08)  POCT Blood Glucose.: 162 mg/dL (20 May 2018 08:03)  POCT Blood Glucose.: 135 mg/dL (19 May 2018 23:12)  RECENT CULTURES:    RADIOLOGY & ADDITIONAL TESTS:  < from: CT Abdomen and Pelvis No Cont (18 @ 17:31) >  IMPRESSION:     Plan inguinal hernia containing a knuckle of nonobstructive small bowel  No hydronephrosis.  No bowel obstruction or gross bowel wall thickening. Normal appendix.   Colonic diverticulosis without definite evidence of diverticulitis.    < end of copied text >    < from: Xray Chest 1 View AP/PA. (18 @ 17:03) >  The heart is normal in size. Prominence of the bilateral paratracheal   stripes may represent an enlarged thyroid gland. Lungs are grossly clear.   The apices and hemidiaphragms are unremarkable. Degenerative changes of   the visualized osseous structures.  Impression:  No acute disease  < end of copied text >    MEDICATIONS  (STANDING):  aspirin enteric coated 81 milliGRAM(s) Oral daily  cefTRIAXone Injectable.      dextrose 5%. 1000 milliLiter(s) (50 mL/Hr) IV Continuous <Continuous>  dextrose 50% Injectable 12.5 Gram(s) IV Push once  dextrose 50% Injectable 25 Gram(s) IV Push once  dextrose 50% Injectable 25 Gram(s) IV Push once  heparin  Injectable 5000 Unit(s) SubCutaneous every 8 hours  multivitamin 1 Tablet(s) Oral daily  sodium chloride 0.9%. 1500 milliLiter(s) (100 mL/Hr) IV Continuous <Continuous>    MEDICATIONS  (PRN):  acetaminophen   Tablet 650 milliGRAM(s) Oral every 6 hours PRN pain and fever  dextrose 40% Gel 15 Gram(s) Oral once PRN Blood Glucose LESS THAN 70 milliGRAM(s)/deciliter  glucagon  Injectable 1 milliGRAM(s) IntraMuscular once PRN Glucose LESS THAN 70 milligrams/deciliter  polyethylene glycol 3350 17 Gram(s) Oral daily PRN Constipation

## 2018-05-21 LAB
ANION GAP SERPL CALC-SCNC: 12 MMOL/L — SIGNIFICANT CHANGE UP (ref 5–17)
BUN SERPL-MCNC: 20 MG/DL — SIGNIFICANT CHANGE UP (ref 7–23)
CALCIUM SERPL-MCNC: 8.1 MG/DL — LOW (ref 8.5–10.1)
CHLORIDE SERPL-SCNC: 109 MMOL/L — HIGH (ref 96–108)
CK SERPL-CCNC: 81 U/L — SIGNIFICANT CHANGE UP (ref 26–192)
CO2 SERPL-SCNC: 21 MMOL/L — LOW (ref 22–31)
CREAT SERPL-MCNC: 0.64 MG/DL — SIGNIFICANT CHANGE UP (ref 0.5–1.3)
GLUCOSE SERPL-MCNC: 122 MG/DL — HIGH (ref 70–99)
HCT VFR BLD CALC: 37.9 % — SIGNIFICANT CHANGE UP (ref 34.5–45)
HGB BLD-MCNC: 12.1 G/DL — SIGNIFICANT CHANGE UP (ref 11.5–15.5)
MCHC RBC-ENTMCNC: 28.1 PG — SIGNIFICANT CHANGE UP (ref 27–34)
MCHC RBC-ENTMCNC: 31.9 GM/DL — LOW (ref 32–36)
MCV RBC AUTO: 88.1 FL — SIGNIFICANT CHANGE UP (ref 80–100)
NRBC # BLD: 0 /100 WBCS — SIGNIFICANT CHANGE UP (ref 0–0)
NT-PROBNP SERPL-SCNC: 72 PG/ML — SIGNIFICANT CHANGE UP (ref 0–450)
PLATELET # BLD AUTO: 243 K/UL — SIGNIFICANT CHANGE UP (ref 150–400)
POTASSIUM SERPL-MCNC: 4.1 MMOL/L — SIGNIFICANT CHANGE UP (ref 3.5–5.3)
POTASSIUM SERPL-SCNC: 4.1 MMOL/L — SIGNIFICANT CHANGE UP (ref 3.5–5.3)
RBC # BLD: 4.3 M/UL — SIGNIFICANT CHANGE UP (ref 3.8–5.2)
RBC # FLD: 12.9 % — SIGNIFICANT CHANGE UP (ref 10.3–14.5)
SODIUM SERPL-SCNC: 142 MMOL/L — SIGNIFICANT CHANGE UP (ref 135–145)
TROPONIN I SERPL-MCNC: <0.015 NG/ML — SIGNIFICANT CHANGE UP (ref 0.01–0.04)
WBC # BLD: 5.19 K/UL — SIGNIFICANT CHANGE UP (ref 3.8–10.5)
WBC # FLD AUTO: 5.19 K/UL — SIGNIFICANT CHANGE UP (ref 3.8–10.5)

## 2018-05-21 PROCEDURE — 93010 ELECTROCARDIOGRAM REPORT: CPT

## 2018-05-21 RX ADMIN — HEPARIN SODIUM 5000 UNIT(S): 5000 INJECTION INTRAVENOUS; SUBCUTANEOUS at 05:29

## 2018-05-21 RX ADMIN — Medication 81 MILLIGRAM(S): at 11:56

## 2018-05-21 RX ADMIN — HEPARIN SODIUM 5000 UNIT(S): 5000 INJECTION INTRAVENOUS; SUBCUTANEOUS at 13:22

## 2018-05-21 RX ADMIN — CEFTRIAXONE 2000 MILLIGRAM(S): 500 INJECTION, POWDER, FOR SOLUTION INTRAMUSCULAR; INTRAVENOUS at 13:28

## 2018-05-21 RX ADMIN — HEPARIN SODIUM 5000 UNIT(S): 5000 INJECTION INTRAVENOUS; SUBCUTANEOUS at 22:06

## 2018-05-21 RX ADMIN — Medication 1 TABLET(S): at 11:55

## 2018-05-21 NOTE — CONSULT NOTE ADULT - SUBJECTIVE AND OBJECTIVE BOX
Chief complaint of c/o Near syncope (19 May 2018 22:00)    HPI:  78-year-old female admitted with complaints of dizziness and almost passing out. While standing in a [employer] patient felt dizzy, hot and diaphoretic and felt like as if she would pass out. No complains of palpitations. Denied chest pain. No shortness of breath. No loss of consciousness. She had had complains of abdominal pain prior to the episode. She's had chronic abdominal pains.      PAST MEDICAL & SURGICAL HISTORY:  HTN  HLD  DM  No significant past surgical history      PREVIOUS CARDIAC WORKUP:  None      ALLERGIES:    No Known Allergies       MEDICATIONS  (STANDING):  aspirin enteric coated 81 milliGRAM(s) Oral daily  cefTRIAXone Injectable.      cefTRIAXone Injectable. 2000 milliGRAM(s) IV Push every 24 hours  heparin  Injectable 5000 Unit(s) SubCutaneous every 8 hours  multivitamin 1 Tablet(s) Oral daily  sodium chloride 0.9%. 1000 milliLiter(s) (75 mL/Hr) IV Continuous <Continuous>    MEDICATIONS  (PRN):  acetaminophen   Tablet 650 milliGRAM(s) Oral every 6 hours PRN pain and fever  polyethylene glycol 3350 17 Gram(s) Oral daily PRN Constipation      FAMILY HISTORY:  No pertinent family history in first degree relatives        SOCIAL HISTORY:  Nonsmoker. No ETOH abuse. No illicit drugs.     ROS:     Detailed ten system ROS was performed and was negative except for history as eluded to above.    no fever  no chills  no nausea  no vomiting  no diarrhea  no constipation  no melena  no hematochezia  no chest pain  no palpitations  no sob at rest  no dyspnea on exertion  no cough  no wheezing  no anorexia  no headache  no dizziness - resolved  no syncope  no weakness  no myalgia  no dysuria  no polyuria  no hematuria     Vital Signs Last 24 Hrs  T(C): 36.3 (21 May 2018 04:13), Max: 37.1 (20 May 2018 16:43)  T(F): 97.4 (21 May 2018 04:13), Max: 98.8 (20 May 2018 16:43)  HR: 62 (21 May 2018 04:13) (62 - 78)  BP: 113/64 (21 May 2018 04:13) (112/49 - 139/63)  BP(mean): --  RR: 16 (20 May 2018 10:41) (16 - 16)  SpO2: 95% (21 May 2018 04:13) (94% - 97%)        PHYSICAL EXAM:    General:                Comfortable, AAO X 3, in no distress.  HEENT:                  Atraumatic, PERRLA, EOMI, conjunctiva clear.    Neck:                     Supple, no adenopathy, no thyromegaly, no JVD, no bruit.  Back:                     Symmetric, non tender.  Chest:                    Clear, B/L symmetric air entry, no tachypnea  Heart:                     S1, S2 normal, no gallop, no murmur.  Abdomen:              Soft, non-tender, bowel sounds active. No palpable masses.  Extremities:           no cyanosis, no edema. Peripheral pulses normal.  Skin:                      Skin color, texture normal. No rashes.  Neurologic:            Grossly nonfocal.       TELEMETRY:   Normal sinus rhythm with no tachy or seferino events     ECG:  NSR, no ST T changes of ischemia or infarction.       LABS:                          12.1   5.19  )-----------( 243      ( 21 May 2018 06:16 )             37.9     05-20    140  |  104  |  17  ----------------------------<  129<H>  3.6   |  28  |  0.67    Ca    8.6      20 May 2018 06:37  Phos  3.6     05-20  Mg     2.1     05-20    TPro  7.6  /  Alb  3.7  /  TBili  0.6  /  DBili  x   /  AST  17  /  ALT  21  /  AlkPhos  42  05-19    Lipid Panel  Chl 148  HDL 42  LDL 71  Trg 176      05-19 @ 22:05  Trop-I  <0.015    05-19 @ 16:41  Trop-I  <0.015      PT/INR - ( 20 May 2018 06:37 )   PT: 11.5 sec;   INR: 1.06 ratio    PTT - ( 20 May 2018 06:37 )  PTT:25.8 sec    Urinalysis: Few bacteria and 26-50 WBC      RADIOLOGY & ADDITIONAL STUDIES:    Xray Chest 1 View AP/PA. (05.19.18 @ 17:03) >   Findings: The heart is normal in size. Prominence of the bilateral paratracheal stripes may represent an enlarged thyroid gland. Lungs are grossly clear. The apices and hemidiaphragms are unremarkable. Degenerative changes of the visualized osseous structures.    CT Abdomen and Pelvis No Cont (05.19.18 @ 17:31) >    IMPRESSION: Right inguinal hernia containing a knuckle of nonobstructive small bowel. No hydronephrosis. No bowel obstruction or gross bowel wall thickening. Normal appendix. Colonic diverticulosis without definite evidence of diverticulitis.

## 2018-05-21 NOTE — CONSULT NOTE ADULT - ASSESSMENT
Near Syncope. Vasovagal, sec to abdominal pain  ? Dehydration  ? UTI  HTN  HLD  DM    Suggest:    Hydration  Tele is unremarkable for 24 hours. Out pt further cardiac work up Near Syncope. Vasovagal, sec to abdominal pain  ? Dehydration  ? UTI  HTN  HLD  DM    Suggest:    Hydration  D/C tele. Tele has been unremarkable for 24 hours.   Out pt further cardiac work up  ABx for possible UTI  Continue current treatment for HTN, HLD, DM  Advised pt to follow up with me as out pt.   I shall f/u PRN now.

## 2018-05-21 NOTE — PROGRESS NOTE ADULT - ATTENDING COMMENTS
Dispo - IV fluids, IV abx
Patient seen and examined with NP Dalila Alexander . I performed a history and physical examination of the patient and discussed patient’s management with the NP and spend 40 minutes face to face time.   I reviewed the NP’s note and agree with the documented findings and plan of care.

## 2018-05-21 NOTE — PROGRESS NOTE ADULT - ASSESSMENT
77 yo F with PMH significant for HTN, HLD, DM2 presented to the ED c/o near syncope.    # Pre Syncope ruled out ACS, due to Dehydration, vasovagal etiology   - she r/o ACS with negative cardiac biomarkers   - Cardio consult appreciated: no further cardiac work -up; dc tele  - IVF NS gentle hydration  - Orthostatic vitals - negative  -  con't ASA daily    # Abdominal pain due to UTI  # Chronic constipation  - CTAP: inguinal hernia containing a knuckle of nonobstructive small bowel/ Enterocele/ Colonic Diverticulosis/ Subcm mesenteric LN  - UA - pyuria, dysuria +  - con't  IV ceftriaxone until Ucx sensitivity results  - Laxatives prn  - Outpt f/u with PCP after discharge for further eval  - If abdominal pain recurs consider GI consult and further inpatient w/u    # HTN  - c/w Enalapril  - con't to hold HCTZ  - Monitor BP    # HLD  - Lipid panel LDL at goal  - not on meds at home    #h/o DM, diet controlled, in prediabetic range   - Fingerstick check - d/c  - HBA1C - 5.9   - Pt reported that she does not take any  meds for DM2, controlled by diet mostly    # DVT Ppx  - SQ heparin

## 2018-05-21 NOTE — PROGRESS NOTE ADULT - SUBJECTIVE AND OBJECTIVE BOX
Pt seen, chart reviewed  CC:  Near syncope      HPI: 79 yo F with PMH significant for HTN, HLD, DM2 presents to the ED on 18  s/p near syncope. As per chart review, pt reported that she was at [employer] and started to feel dizzy, hot, diaphoretic so sits down on bench. Denies LOC.     In the ED, BUN/Cr: 20/0.98, Trop 1: < 0.015  CTAP:  inguinal hernia containing a knuckle of nonobstructive small bowel. No hydronephrosis. No bowel obstruction or gross bowel wall thickening. Normal appendix. Colonic diverticulosis without definite evidence of diverticulitis.     HOSPITAL COURSE   -  dizziness resolved, reports lower abd pain, dysuria, UA - pyuria noted, reports h/o  recent UTI treated with Abx, afebrile, denies HA, numbness, CP, no events overnight  - not dizzy, still with hypogastric pain, no n/v/d/f/c; no CP/palpitations/HA/dizziness    Review of system- Rest of the review of system are negative except as mentioned above    Vital Signs Last 24 Hrs  T(C): 36.8 (21 May 2018 10:36), Max: 37.1 (20 May 2018 16:43)  T(F): 98.2 (21 May 2018 10:36), Max: 98.8 (20 May 2018 16:43)  HR: 71 (21 May 2018 10:36) (62 - 78)  BP: 126/60 (21 May 2018 10:36) (113/64 - 139/63)  BP(mean): --  RR: 18 (21 May 2018 10:36) (18 - 18)  SpO2: 97% (21 May 2018 10:36) (94% - 97%)    PHYSICAL EXAM:  GENERAL: NAD  NERVOUS SYSTEM:  Alert & Oriented X3, non- focal exam,  HEAD:  Atraumatic, Normocephalic  EYES: EOMI, PERRLA, conjunctiva and sclera clear  HEENT: Moist mucous membranes  NECK: Supple, No JVD  CHEST/LUNG: Clear to auscultation bilaterally; No rales, no rhonchi, no wheezing, or rubs  HEART: Regular rate and rhythm; No murmurs, rubs, or gallops  ABDOMEN: Soft, +suprapubic tenderness,  Nondistended; Bowel sounds present, no guarding, no peritoneal signs  GENITOURINARY- Voiding, no suprapubic tenderness  EXTREMITIES:  2+ Peripheral Pulses, No clubbing, cyanosis, or edema  MUSCULOSKELETAL:- No muscle tenderness, Muscle tone normal, No joint tenderness, no Joint swelling, Joint range of motion-normal  SKIN-no rash, no lesion    LABS:             12.1   5.19  )-----------( 243      ( 21 May 2018 06:16 )             37.9     05-21    142  |  109<H>  |  20  ----------------------------<  122<H>  4.1   |  21<L>  |  0.64    Ca    8.1<L>      21 May 2018 06:16  Phos  3.6     05-20  Mg     2.1     -20    TPro  7.6  /  Alb  3.7  /  TBili  0.6  /  DBili  x   /  AST  17  /  ALT  21  /  AlkPhos  42  05-19    CARDIAC MARKERS ( 21 May 2018 06:16 )  <0.015 ng/mL / x     / 81 U/L / x     / x      CARDIAC MARKERS ( 19 May 2018 22:05 )  <0.015 ng/mL / x     / x     / x     / x      CARDIAC MARKERS ( 19 May 2018 16:41 )  <0.015 ng/mL / x     / x     / x     / x        LIVER FUNCTIONS - ( 19 May 2018 16:41 )  Alb: 3.7 g/dL / Pro: 7.6 gm/dL / ALK PHOS: 42 U/L / ALT: 21 U/L / AST: 17 U/L / GGT: x           PT/INR - ( 20 May 2018 06:37 )   PT: 11.5 sec;   INR: 1.06 ratio       PTT - ( 20 May 2018 06:37 )  PTT:25.8 sec  Urinalysis Basic - ( 19 May 2018 23:40 )    Color: Yellow / Appearance: Clear / S.020 / pH: x  Gluc: x / Ketone: Negative  / Bili: Negative / Urobili: Negative mg/dL   Blood: x / Protein: Negative mg/dL / Nitrite: Negative   Leuk Esterase: Moderate / RBC: 3-5 /HPF / WBC 26-50   Sq Epi: x / Non Sq Epi: Few / Bacteria: Few    Serum Pro-Brain Natriuretic Peptide: 72 pg/mL (18 @ 06:16)    Culture - Urine (collected 18 @ 11:45)  Source: .Urine Clean Catch (Midstream)  Preliminary Report (18 @ 11:50):    >100,000 CFU/ml Escherichia coli    -20                        12.0   6.48  )-----------( 271      ( 20 May 2018 06:37 )             37.7         140  |  104  |  17  ----------------------------<  129<H>  3.6   |  28  |  0.67    Ca    8.6      20 May 2018 06:37  Phos  3.6       Mg     2.1         TPro  7.6  /  Alb  3.7  /  TBili  0.6  /  DBili  x   /  AST  17  /  ALT  21  /  AlkPhos  42      PT/INR - ( 20 May 2018 06:37 )   PT: 11.5 sec;   INR: 1.06 ratio         PTT - ( 20 May 2018 06:37 )  PTT:25.8 sec  CARDIAC MARKERS ( 19 May 2018 22:05 )  <0.015 ng/mL / x     / x     / x     / x      CARDIAC MARKERS ( 19 May 2018 16:41 )  <0.015 ng/mL / x     / x     / x     / x      Urinalysis Basic - ( 19 May 2018 23:40 )    Color: Yellow / Appearance: Clear / S.020 / pH: x  Gluc: x / Ketone: Negative  / Bili: Negative / Urobili: Negative mg/dL   Blood: x / Protein: Negative mg/dL / Nitrite: Negative   Leuk Esterase: Moderate / RBC: 3-5 /HPF / WBC 26-50   Sq Epi: x / Non Sq Epi: Few / Bacteria: Few  CAPILLARY BLOOD GLUCOSE  POCT Blood Glucose.: 117 mg/dL (20 May 2018 12:08)  POCT Blood Glucose.: 162 mg/dL (20 May 2018 08:03)  POCT Blood Glucose.: 135 mg/dL (19 May 2018 23:12)  RECENT CULTURES:    RADIOLOGY & ADDITIONAL TESTS:  < from: CT Abdomen and Pelvis No Cont (18 @ 17:31) >  IMPRESSION:     Plan inguinal hernia containing a knuckle of nonobstructive small bowel  No hydronephrosis.  No bowel obstruction or gross bowel wall thickening. Normal appendix.   Colonic diverticulosis without definite evidence of diverticulitis.    < end of copied text >    < from: Xray Chest 1 View AP/PA. (18 @ 17:03) >  The heart is normal in size. Prominence of the bilateral paratracheal   stripes may represent an enlarged thyroid gland. Lungs are grossly clear.   The apices and hemidiaphragms are unremarkable. Degenerative changes of   the visualized osseous structures.    Impression: No acute disease  < end of copied text >    MEDICATIONS  (STANDING):    aspirin enteric coated 81 milliGRAM(s) Oral daily  cefTRIAXone Injectable.      cefTRIAXone Injectable. 2000 milliGRAM(s) IV Push every 24 hours  heparin  Injectable 5000 Unit(s) SubCutaneous every 8 hours  multivitamin 1 Tablet(s) Oral daily  sodium chloride 0.9%. 1000 milliLiter(s) (75 mL/Hr) IV Continuous <Continuous>    MEDICATIONS  (PRN):  acetaminophen   Tablet 650 milliGRAM(s) Oral every 6 hours PRN pain and fever  polyethylene glycol 3350 17 Gram(s) Oral daily PRN Constipation

## 2018-05-22 VITALS
TEMPERATURE: 99 F | SYSTOLIC BLOOD PRESSURE: 151 MMHG | HEART RATE: 62 BPM | DIASTOLIC BLOOD PRESSURE: 72 MMHG | OXYGEN SATURATION: 95 % | RESPIRATION RATE: 18 BRPM

## 2018-05-22 LAB
-  AMIKACIN: SIGNIFICANT CHANGE UP
-  AMOXICILLIN/CLAVULANIC ACID: SIGNIFICANT CHANGE UP
-  AMPICILLIN/SULBACTAM: SIGNIFICANT CHANGE UP
-  AMPICILLIN: SIGNIFICANT CHANGE UP
-  AZTREONAM: SIGNIFICANT CHANGE UP
-  CEFAZOLIN: SIGNIFICANT CHANGE UP
-  CEFEPIME: SIGNIFICANT CHANGE UP
-  CEFOXITIN: SIGNIFICANT CHANGE UP
-  CEFTRIAXONE: SIGNIFICANT CHANGE UP
-  CIPROFLOXACIN: SIGNIFICANT CHANGE UP
-  ERTAPENEM: SIGNIFICANT CHANGE UP
-  GENTAMICIN: SIGNIFICANT CHANGE UP
-  IMIPENEM: SIGNIFICANT CHANGE UP
-  LEVOFLOXACIN: SIGNIFICANT CHANGE UP
-  MEROPENEM: SIGNIFICANT CHANGE UP
-  NITROFURANTOIN: SIGNIFICANT CHANGE UP
-  PIPERACILLIN/TAZOBACTAM: SIGNIFICANT CHANGE UP
-  TIGECYCLINE: SIGNIFICANT CHANGE UP
-  TOBRAMYCIN: SIGNIFICANT CHANGE UP
-  TRIMETHOPRIM/SULFAMETHOXAZOLE: SIGNIFICANT CHANGE UP
ANION GAP SERPL CALC-SCNC: 10 MMOL/L — SIGNIFICANT CHANGE UP (ref 5–17)
BUN SERPL-MCNC: 12 MG/DL — SIGNIFICANT CHANGE UP (ref 7–23)
CALCIUM SERPL-MCNC: 8.3 MG/DL — LOW (ref 8.5–10.1)
CHLORIDE SERPL-SCNC: 108 MMOL/L — SIGNIFICANT CHANGE UP (ref 96–108)
CO2 SERPL-SCNC: 24 MMOL/L — SIGNIFICANT CHANGE UP (ref 22–31)
CREAT SERPL-MCNC: 0.6 MG/DL — SIGNIFICANT CHANGE UP (ref 0.5–1.3)
CULTURE RESULTS: SIGNIFICANT CHANGE UP
GLUCOSE BLDC GLUCOMTR-MCNC: 123 MG/DL — HIGH (ref 70–99)
GLUCOSE BLDC GLUCOMTR-MCNC: 133 MG/DL — HIGH (ref 70–99)
GLUCOSE SERPL-MCNC: 125 MG/DL — HIGH (ref 70–99)
HCT VFR BLD CALC: 40.1 % — SIGNIFICANT CHANGE UP (ref 34.5–45)
HGB BLD-MCNC: 13 G/DL — SIGNIFICANT CHANGE UP (ref 11.5–15.5)
MAGNESIUM SERPL-MCNC: 2.4 MG/DL — SIGNIFICANT CHANGE UP (ref 1.6–2.6)
MCHC RBC-ENTMCNC: 28 PG — SIGNIFICANT CHANGE UP (ref 27–34)
MCHC RBC-ENTMCNC: 32.4 GM/DL — SIGNIFICANT CHANGE UP (ref 32–36)
MCV RBC AUTO: 86.4 FL — SIGNIFICANT CHANGE UP (ref 80–100)
METHOD TYPE: SIGNIFICANT CHANGE UP
NRBC # BLD: 0 /100 WBCS — SIGNIFICANT CHANGE UP (ref 0–0)
ORGANISM # SPEC MICROSCOPIC CNT: SIGNIFICANT CHANGE UP
ORGANISM # SPEC MICROSCOPIC CNT: SIGNIFICANT CHANGE UP
PLATELET # BLD AUTO: 278 K/UL — SIGNIFICANT CHANGE UP (ref 150–400)
POTASSIUM SERPL-MCNC: 4.1 MMOL/L — SIGNIFICANT CHANGE UP (ref 3.5–5.3)
POTASSIUM SERPL-SCNC: 4.1 MMOL/L — SIGNIFICANT CHANGE UP (ref 3.5–5.3)
RBC # BLD: 4.64 M/UL — SIGNIFICANT CHANGE UP (ref 3.8–5.2)
RBC # FLD: 12.9 % — SIGNIFICANT CHANGE UP (ref 10.3–14.5)
SODIUM SERPL-SCNC: 142 MMOL/L — SIGNIFICANT CHANGE UP (ref 135–145)
SPECIMEN SOURCE: SIGNIFICANT CHANGE UP
WBC # BLD: 6.96 K/UL — SIGNIFICANT CHANGE UP (ref 3.8–10.5)
WBC # FLD AUTO: 6.96 K/UL — SIGNIFICANT CHANGE UP (ref 3.8–10.5)

## 2018-05-22 RX ORDER — CEFUROXIME AXETIL 250 MG
1 TABLET ORAL
Qty: 3 | Refills: 0 | OUTPATIENT
Start: 2018-05-22 | End: 2018-05-22

## 2018-05-22 RX ORDER — POLYETHYLENE GLYCOL 3350 17 G/17G
17 POWDER, FOR SOLUTION ORAL
Qty: 0 | Refills: 0 | COMMUNITY
Start: 2018-05-22

## 2018-05-22 RX ORDER — ASPIRIN/CALCIUM CARB/MAGNESIUM 324 MG
1 TABLET ORAL
Qty: 30 | Refills: 0 | OUTPATIENT
Start: 2018-05-22 | End: 2018-06-20

## 2018-05-22 RX ORDER — ENALAPRIL MALEATE AND HYDROCHLOROTHIAZIDE 10; 25 MG/1; MG/1
1 TABLET ORAL
Qty: 0 | Refills: 0 | COMMUNITY

## 2018-05-22 RX ADMIN — Medication 1 TABLET(S): at 11:40

## 2018-05-22 RX ADMIN — CEFTRIAXONE 2000 MILLIGRAM(S): 500 INJECTION, POWDER, FOR SOLUTION INTRAMUSCULAR; INTRAVENOUS at 13:23

## 2018-05-22 RX ADMIN — Medication 81 MILLIGRAM(S): at 11:40

## 2018-05-22 RX ADMIN — HEPARIN SODIUM 5000 UNIT(S): 5000 INJECTION INTRAVENOUS; SUBCUTANEOUS at 05:12

## 2018-05-22 RX ADMIN — HEPARIN SODIUM 5000 UNIT(S): 5000 INJECTION INTRAVENOUS; SUBCUTANEOUS at 13:23

## 2018-05-22 NOTE — DISCHARGE NOTE ADULT - CARE PLAN
Principal Discharge DX:	Syncope, unspecified syncope type  Goal:	prevent recurrence  Assessment and plan of treatment:	follow up with cardiology within 1 week  Secondary Diagnosis:	DM (diabetes mellitus)  Assessment and plan of treatment:	diet, A1C 5.9 in pre-diabetic range  Secondary Diagnosis:	HTN (hypertension)  Assessment and plan of treatment:	stop BP medication, follow up with PCP within 1 week, blood pressure well controlled without medications  Secondary Diagnosis:	HLD (hyperlipidemia)  Assessment and plan of treatment:	continue low fat diet  Secondary Diagnosis:	UTI (urinary tract infection)  Assessment and plan of treatment:	complete antibiotics , follow up with PCP within 1 week

## 2018-05-22 NOTE — DISCHARGE NOTE ADULT - PATIENT PORTAL LINK FT
You can access the [facility] Portal, offered by [facility], by registering with the following website: [email or web address]

## 2018-05-22 NOTE — DISCHARGE NOTE ADULT - MEDICATION SUMMARY - MEDICATIONS TO CHANGE
No pertinent past medical history I will SWITCH the dose or number of times a day I take the medications listed below when I get home from the hospital:  None

## 2018-05-22 NOTE — DISCHARGE NOTE ADULT - HOSPITAL COURSE
CC:  Near syncope      HPI: 77 yo F with PMH significant for HTN, HLD, DM2 presents to the ED on 5/18/18  s/p near syncope. As per chart review, pt reported that she was at [employer] and started to feel dizzy, hot, diaphoretic so sits down on bench. Denies LOC.   In the ED, BUN/Cr: 20/0.98, Trop 1: < 0.015  CTAP:  inguinal hernia containing a knuckle of nonobstructive small bowel. No hydronephrosis. No bowel obstruction or gross bowel wall thickening. Normal appendix. Colonic diverticulosis without definite evidence of diverticulitis.   HOSPITAL COURSE  5/20 -  dizziness resolved, reports lower abd pain, dysuria, UA - pyuria noted, reports h/o  recent UTI treated with Abx, afebrile, denies HA, numbness, CP, no events overnight  5/21- not dizzy, still with hypogastric pain, no n/v/d/f/c; no CP/palpitations/HA/dizziness  5/22 - denies  chest pain, dizziness, + dysuria improving, afebrile  Review of system- Rest of the review of system are negative except as mentioned above  T(C): 36.6 (05-22-18 @ 10:40), Max: 36.8 (05-21-18 @ 17:04)  T(F): 97.9 (05-22-18 @ 10:40), Max: 98.2 (05-21-18 @ 17:04)  HR: 65 (05-22-18 @ 10:40) (52 - 65)  BP: 120/67 (05-22-18 @ 10:40) (114/51 - 132/62)  RR: 17 (05-22-18 @ 10:40) (17 - 19)  SpO2: 98% (05-22-18 @ 10:40) (98% - 100%)  Wt(kg): --  GENERAL: NAD  NERVOUS SYSTEM:  Alert & Oriented X3, non- focal exam,  HEAD:  Atraumatic, Normocephalic  EYES: EOMI, PERRLA, conjunctiva and sclera clear  HEENT: Moist mucous membranes  NECK: Supple, No JVD  CHEST/LUNG: Clear to auscultation bilaterally; No rales, no rhonchi, no wheezing, or rubs  HEART: Regular rate and rhythm; No murmurs, rubs, or gallops  ABDOMEN: Soft, +suprapubic tenderness,  Nondistended; Bowel sounds present, no guarding, no peritoneal signs  GENITOURINARY- Voiding, no suprapubic tenderness  EXTREMITIES:  2+ Peripheral Pulses, No clubbing, cyanosis, or edema  MUSCULOSKELETAL:- No muscle tenderness, Muscle tone normal, No joint tenderness, no Joint swelling, Joint range of motion-normal  77 yo F with PMH significant for HTN, HLD, DM2 presented to the ED c/o near syncope.    # Pre Syncope ruled out ACS, due to Dehydration, vasovagal etiology   - she r/o ACS with negative cardiac biomarkers   - Cardio consult appreciated: no further cardiac work -up; dc tele  - IVF NS gentle hydration  - Orthostatic vitals - negative  -  con't ASA daily  # Abdominal pain due to UTI due to E coli    # Chronic constipation  - CTAP: inguinal hernia containing a knuckle of nonobstructive small bowel/ Enterocele/ Colonic Diverticulosis/ Subcm mesenteric LN  - UA - pyuria, dysuria +  - con't  IV ceftriaxone--> ceftin  - Laxatives prn  - Outpt f/u with PCP after discharge for further eval  - If abdominal pain recurs consider GI consult and further inpatient w/u  # HTN  - well controlled, without medication   - d/c BP meds   # HLD  - Lipid panel LDL at goal  - not on meds at home  #h/o DM, diet controlled, in prediabetic range   - Fingerstick check - d/c  - HBA1C - 5.9   - Pt reported that she does not take any  meds for DM2, controlled by diet mostly  # DVT Ppx  - SQ heparin  Disposition - medically optimized to be discharged home with close follow up with PCP, cardiology within 1 week  complete antibiotics  return to ED if fever, abdominal pain, nausea, vomiting, chest pain, dyspnea  Discharge plan discussed with patient, RN  Patient advised to follow up with PCP within 3-7 days  time spend 40 min  left message to PCP , discharge note faxed to PCP

## 2018-05-22 NOTE — DISCHARGE NOTE ADULT - CARE PROVIDER_API CALL
Cody Jones), Family Medicine  [address] [place] [address]  Phone: [phone]  Fax: [phone]    Mami Apple), Cardiology; Interventional Cardiology  [address] [facility]  [place] [address]  Phone: [phone]  Fax: [phone]

## 2018-05-22 NOTE — DISCHARGE NOTE ADULT - MEDICATION SUMMARY - MEDICATIONS TO TAKE
I will START or STAY ON the medications listed below when I get home from the hospital:    aspirin 81 mg oral delayed release tablet  -- 1 tab(s) by mouth once a day  -- Indication: For SYNCOPE    cefuroxime 500 mg oral tablet  -- 1 tab(s) by mouth 2 times a day   -- Finish all this medication unless otherwise directed by prescriber.  Medication should be taken with plenty of water.  Take with food or milk.    -- Indication: For UTI    polyethylene glycol 3350 oral powder for reconstitution  -- 17 gram(s) by mouth once a day, As needed, Constipation  -- Indication: For constipation    Multiple Vitamins oral tablet  -- 1 tab(s) by mouth once a day  -- Indication: For vitamins I will START or STAY ON the medications listed below when I get home from the hospital:    aspirin 81 mg oral delayed release tablet  -- 1 tab(s) by mouth once a day  -- Indication: For SYNCOPE    cefuroxime 500 mg oral tablet  -- 1 tab(s) by mouth 2 times a day   -- Finish all this medication unless otherwise directed by prescriber.  Medication should be taken with plenty of water.  Take with food or milk.    -- Indication: For UTI (urinary tract infection)    polyethylene glycol 3350 oral powder for reconstitution  -- 17 gram(s) by mouth once a day, As needed, Constipation  -- Indication: For constipation    Multiple Vitamins oral tablet  -- 1 tab(s) by mouth once a day  -- Indication: For vitamin

## 2018-05-22 NOTE — DISCHARGE NOTE ADULT - PLAN OF CARE
prevent recurrence follow up with cardiology within 1 week diet, A1C 5.9 in pre-diabetic range stop BP medication, follow up with PCP within 1 week, blood pressure well controlled without medications continue low fat diet complete antibiotics , follow up with PCP within 1 week

## 2018-05-22 NOTE — DISCHARGE NOTE ADULT - MEDICATION SUMMARY - MEDICATIONS TO STOP TAKING
I will STOP taking the medications listed below when I get home from the hospital:    enalapril-hydrochlorothiazide    enalapril-hydrochlorothiazide 10 mg-25 mg oral tablet  -- 1 tab(s) by mouth once a day I will STOP taking the medications listed below when I get home from the hospital:  None

## 2018-05-22 NOTE — DISCHARGE NOTE ADULT - OTHER SIGNIFICANT FINDINGS
Complete Blood Count in AM (05.22.18 @ 05:42)    Nucleated RBC: 0 /100 WBCs    WBC Count: 6.96 K/uL    RBC Count: 4.64 M/uL    Hemoglobin: 13.0 g/dL    Hematocrit: 40.1 %    Mean Cell Volume: 86.4 fl    Mean Cell Hemoglobin: 28.0 pg    Mean Cell Hemoglobin Conc: 32.4 gm/dL    Red Cell Distrib Width: 12.9 %    Platelet Count - Automated: 278 K/uL    Basic Metabolic Panel in AM (05.22.18 @ 05:42)    Sodium, Serum: 142 mmol/L    Potassium, Serum: 4.1 mmol/L    Chloride, Serum: 108 mmol/L    Carbon Dioxide, Serum: 24 mmol/L    Anion Gap, Serum: 10 mmol/L    Blood Urea Nitrogen, Serum: 12 mg/dL    Creatinine, Serum: 0.60 mg/dL    Glucose, Serum: 125 mg/dL    Calcium, Total Serum: 8.3 mg/dL  Troponin I, Serum (05.21.18 @ 06:16)    Troponin I, Serum: <0.015: High Sensitivity Troponin and new reference  range effective 7/6/2016 ng/mL    Troponin I, Serum: <0.015: High Sensitivity Troponin and new reference  range effective 7/6/2016 ng/mL (05.19.18 @ 22:05)    Serum Pro-Brain Natriuretic Peptide (05.21.18 @ 06:16)    Serum Pro-Brain Natriuretic Peptide: 72 pg/mL    Culture - Urine (05.20.18 @ 11:45)    -  Amikacin: S <=8    -  Amoxicillin/Clavulanic Acid: S <=8/4    -  Ampicillin: R >16 These ampicillin results predict results for amoxicillin    -  Ampicillin/Sulbactam: I 16/8    -  Aztreonam: S <=4    -  Cefazolin: S <=2 This predicts results for oral agents cefaclor, cefdinir, cefpodoxime, cefprozil, cefuroxime axetil, cephalexin and locarbef for uncomplicated UTI. Note that some isolates may be susceptible to these agents while testing resistant to cefazolin.    -  Cefepime: S <=2    -  Cefoxitin: S <=4    -  Ceftriaxone: S <=1 Enterobacter, Citrobacter, and Serratia may develop resistance during prolonged therapy    -  Ciprofloxacin: R >2    -  Ertapenem: S <=0.5    -  Gentamicin: S <=1    -  Imipenem: S <=1    -  Levofloxacin: R >4    -  Meropenem: S <=1    -  Nitrofurantoin: S <=32 Should not be used to treat pyelonephritis    -  Piperacillin/Tazobactam: S <=8    -  Tigecycline: S <=1    -  Tobramycin: S <=2    -  Trimethoprim/Sulfamethoxazole: R >2/38    Specimen Source: .Urine Clean Catch (Midstream)    Culture Results:   >100,000 CFU/ml Escherichia coli    Organism Identification: Escherichia coli    Organism: Escherichia coli    Method Type: JORJE  < from: CT Abdomen and Pelvis No Cont (05.19.18 @ 17:31) >  IMPRESSION:     Plan inguinal hernia containing a knuckle of nonobstructive small bowel    No hydronephrosis.    No bowel obstruction or gross bowel wall thickening. Normal appendix.   Colonic diverticulosis without definite evidence of diverticulitis.    < end of copied text >  < from: Xray Chest 1 View AP/PA. (05.19.18 @ 17:03) >  The heart is normal in size. Prominence of the bilateral paratracheal   stripes may represent an enlarged thyroid gland. Lungs are grossly clear.   The apices and hemidiaphragms are unremarkable. Degenerative changes of   the visualized osseous structures.  Impression:  No acute disease  < end of copied text >

## 2018-05-24 DIAGNOSIS — R55 SYNCOPE AND COLLAPSE: ICD-10-CM

## 2018-05-24 DIAGNOSIS — K40.90 UNILATERAL INGUINAL HERNIA, WITHOUT OBSTRUCTION OR GANGRENE, NOT SPECIFIED AS RECURRENT: ICD-10-CM

## 2018-05-24 DIAGNOSIS — E11.9 TYPE 2 DIABETES MELLITUS WITHOUT COMPLICATIONS: ICD-10-CM

## 2018-05-24 DIAGNOSIS — K59.09 OTHER CONSTIPATION: ICD-10-CM

## 2018-05-24 DIAGNOSIS — I10 ESSENTIAL (PRIMARY) HYPERTENSION: ICD-10-CM

## 2018-05-24 DIAGNOSIS — N39.0 URINARY TRACT INFECTION, SITE NOT SPECIFIED: ICD-10-CM

## 2018-05-24 DIAGNOSIS — E86.0 DEHYDRATION: ICD-10-CM

## 2018-05-24 DIAGNOSIS — B96.20 UNSPECIFIED ESCHERICHIA COLI [E. COLI] AS THE CAUSE OF DISEASES CLASSIFIED ELSEWHERE: ICD-10-CM

## 2018-05-24 DIAGNOSIS — E78.5 HYPERLIPIDEMIA, UNSPECIFIED: ICD-10-CM

## 2018-07-21 ENCOUNTER — EMERGENCY (EMERGENCY)
Facility: HOSPITAL | Age: 78
LOS: 0 days | Discharge: HOME | End: 2018-07-21
Attending: EMERGENCY MEDICINE | Admitting: EMERGENCY MEDICINE
Payer: MEDICARE

## 2018-07-21 VITALS
TEMPERATURE: 98 F | OXYGEN SATURATION: 95 % | DIASTOLIC BLOOD PRESSURE: 61 MMHG | RESPIRATION RATE: 16 BRPM | SYSTOLIC BLOOD PRESSURE: 121 MMHG | HEART RATE: 90 BPM

## 2018-07-21 VITALS — HEIGHT: 65 IN | WEIGHT: 123.9 LBS

## 2018-07-21 DIAGNOSIS — E11.9 TYPE 2 DIABETES MELLITUS WITHOUT COMPLICATIONS: ICD-10-CM

## 2018-07-21 DIAGNOSIS — Z79.82 LONG TERM (CURRENT) USE OF ASPIRIN: ICD-10-CM

## 2018-07-21 DIAGNOSIS — I10 ESSENTIAL (PRIMARY) HYPERTENSION: ICD-10-CM

## 2018-07-21 DIAGNOSIS — R05 COUGH: ICD-10-CM

## 2018-07-21 DIAGNOSIS — J18.9 PNEUMONIA, UNSPECIFIED ORGANISM: ICD-10-CM

## 2018-07-21 PROCEDURE — 99283 EMERGENCY DEPT VISIT LOW MDM: CPT

## 2018-07-21 PROCEDURE — 71045 X-RAY EXAM CHEST 1 VIEW: CPT | Mod: 26

## 2018-07-21 RX ORDER — AZITHROMYCIN 500 MG/1
1 TABLET, FILM COATED ORAL
Qty: 4 | Refills: 0 | OUTPATIENT
Start: 2018-07-21 | End: 2018-07-24

## 2018-07-21 RX ORDER — AZITHROMYCIN 500 MG/1
500 TABLET, FILM COATED ORAL ONCE
Qty: 0 | Refills: 0 | Status: COMPLETED | OUTPATIENT
Start: 2018-07-21 | End: 2018-07-21

## 2018-07-21 RX ADMIN — AZITHROMYCIN 500 MILLIGRAM(S): 500 TABLET, FILM COATED ORAL at 23:07

## 2018-07-21 NOTE — ED ADULT TRIAGE NOTE - CHIEF COMPLAINT QUOTE
Pt presents to ER c/o productive cough x 2 weeks. Denies fever/cough. Normal breathing pattern with no difficulty

## 2018-07-21 NOTE — ED PROVIDER NOTE - MEDICAL DECISION MAKING DETAILS
Afebrile, normal VS.  CXR with RLL opacity which appears new.  Will treat with cough meds, and antibiotics.  F/u with PCP.

## 2018-07-21 NOTE — ED PROVIDER NOTE - OBJECTIVE STATEMENT
77 yo F hx of HTN, DMII, osteoporosis, presents with CC cough.  Symptoms started Sunday.  C/o productive cough with yellow sputum.  Denies fever, chills, hemoptysis, CP, SOB, leg swelling, or any other symptoms.  No meds taken for cough at home.  No sick contacts.  No other concerns.

## 2018-07-21 NOTE — ED ADULT NURSE NOTE - OBJECTIVE STATEMENT
78 year old female with a past medical history of hypertension, hyperlipidemia, and DM on Metformin presenting to the ED via EMS for a cough. Patient reports that symptoms are moderate. Patient states she has had a cough for the past week, describes it as productive, intermittent, with yellow sputum production. Patient states that she is unable to sleep due to cough, reports it is worse tonight.  Patient denies other complaints, is alert, oriented, ambulatory, and conversive without distress or SOB.   Negative: Syncope/LOC, fevers, chills, headache, dizziness, weakness, lethargy, vision changes, hearing changes, focal/lateralizing neurologic deficits, throat pain, chest pain, palpitations, shortness of breath, wheezing, abdominal pain, nausea, vomiting, constipation, diarrhea, urinary signs/symptoms, or edema.   Upon physical examination patient is A+Ox4/GCS 15 and conversive. PERRLA. S1S2 heard. Lung sounds rhonchi Abdomen soft/non-tender. FROM/CMS throughout all extremities.

## 2018-07-21 NOTE — ED ADULT NURSE NOTE - NS ED NURSE DC INFO COMPLEXITY
Returned Demonstration/Moderate: Comprehensive teaching/Patient asked questions/Verbalized Understanding

## 2018-07-22 PROBLEM — E11.9 TYPE 2 DIABETES MELLITUS WITHOUT COMPLICATIONS: Chronic | Status: ACTIVE | Noted: 2018-05-24

## 2018-07-22 PROBLEM — I10 ESSENTIAL (PRIMARY) HYPERTENSION: Chronic | Status: ACTIVE | Noted: 2018-05-24

## 2018-07-22 PROBLEM — E78.5 HYPERLIPIDEMIA, UNSPECIFIED: Chronic | Status: ACTIVE | Noted: 2018-05-24

## 2020-01-09 NOTE — ED PROVIDER NOTE - ALLERGIC/IMMUNOLOGIC NEGATIVE STATEMENT, MLM
no dermatitis, no environmental allergies, no food allergies, no immunosuppressive disorder, and no pruritus.
not applicable

## 2024-09-18 ENCOUNTER — OFFICE (OUTPATIENT)
Dept: URBAN - METROPOLITAN AREA CLINIC 110 | Facility: CLINIC | Age: 84
Setting detail: OPHTHALMOLOGY
End: 2024-09-18
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
## 2024-09-18 PROCEDURE — NO SHOW FE NO SHOW FEE: Performed by: STUDENT IN AN ORGANIZED HEALTH CARE EDUCATION/TRAINING PROGRAM

## 2024-09-19 ENCOUNTER — OFFICE (OUTPATIENT)
Dept: URBAN - METROPOLITAN AREA CLINIC 63 | Facility: CLINIC | Age: 84
Setting detail: OPHTHALMOLOGY
End: 2024-09-19
Payer: MEDICARE

## 2024-09-19 DIAGNOSIS — H02.014: ICD-10-CM

## 2024-09-19 DIAGNOSIS — H26.492: ICD-10-CM

## 2024-09-19 DIAGNOSIS — H35.372: ICD-10-CM

## 2024-09-19 DIAGNOSIS — H16.223: ICD-10-CM

## 2024-09-19 DIAGNOSIS — H02.011: ICD-10-CM

## 2024-09-19 DIAGNOSIS — H02.834: ICD-10-CM

## 2024-09-19 DIAGNOSIS — H52.4: ICD-10-CM

## 2024-09-19 DIAGNOSIS — H02.831: ICD-10-CM

## 2024-09-19 DIAGNOSIS — Z96.1: ICD-10-CM

## 2024-09-19 PROCEDURE — 92134 CPTRZ OPH DX IMG PST SGM RTA: CPT | Performed by: STUDENT IN AN ORGANIZED HEALTH CARE EDUCATION/TRAINING PROGRAM

## 2024-09-19 PROCEDURE — 92014 COMPRE OPH EXAM EST PT 1/>: CPT | Performed by: STUDENT IN AN ORGANIZED HEALTH CARE EDUCATION/TRAINING PROGRAM

## 2024-09-19 PROCEDURE — 92015 DETERMINE REFRACTIVE STATE: CPT | Performed by: STUDENT IN AN ORGANIZED HEALTH CARE EDUCATION/TRAINING PROGRAM

## 2024-09-19 ASSESSMENT — LID EXAM ASSESSMENTS
OS_TRICHIASIS: LUL 1+
OD_TRICHIASIS: RUL 1+

## 2024-09-19 ASSESSMENT — CONFRONTATIONAL VISUAL FIELD TEST (CVF)
OS_FINDINGS: FULL
OD_FINDINGS: FULL

## 2024-09-19 ASSESSMENT — LID POSITION - PTOSIS
OS_PTOSIS: LUL 2+
OD_PTOSIS: RUL 2+

## 2024-09-19 ASSESSMENT — LID POSITION - DERMATOCHALASIS
OS_DERMATOCHALASIS: LUL 3+
OD_DERMATOCHALASIS: RUL 3+

## 2024-10-10 ENCOUNTER — OFFICE (OUTPATIENT)
Dept: URBAN - METROPOLITAN AREA CLINIC 63 | Facility: CLINIC | Age: 84
Setting detail: OPHTHALMOLOGY
End: 2024-10-10
Payer: MEDICARE

## 2024-10-10 DIAGNOSIS — H02.521: ICD-10-CM

## 2024-10-10 DIAGNOSIS — H02.423: ICD-10-CM

## 2024-10-10 DIAGNOSIS — H02.524: ICD-10-CM

## 2024-10-10 DIAGNOSIS — H02.831: ICD-10-CM

## 2024-10-10 PROBLEM — H02.421 PTOSIS MYOGENIC; RIGHT EYE, LEFT EYE: Status: ACTIVE | Noted: 2024-10-10

## 2024-10-10 PROBLEM — H02.422 PTOSIS MYOGENIC; RIGHT EYE, LEFT EYE: Status: ACTIVE | Noted: 2024-10-10

## 2024-10-10 PROCEDURE — 92083 EXTENDED VISUAL FIELD XM: CPT | Performed by: OPHTHALMOLOGY

## 2024-10-10 PROCEDURE — 92285 EXTERNAL OCULAR PHOTOGRAPHY: CPT | Performed by: OPHTHALMOLOGY

## 2024-10-10 PROCEDURE — 99213 OFFICE O/P EST LOW 20 MIN: CPT | Performed by: OPHTHALMOLOGY

## 2024-10-10 ASSESSMENT — REFRACTION_CURRENTRX
OS_VPRISM_DIRECTION: SV
OS_SPHERE: +3.50
OD_CYLINDER: -0.75
OD_VPRISM_DIRECTION: SV
OS_AXIS: 111
OS_CYLINDER: -0.75
OD_AXIS: 135
OS_OVR_VA: 20/
OD_SPHERE: +3.50
OD_OVR_VA: 20/

## 2024-10-10 ASSESSMENT — REFRACTION_MANIFEST
OS_AXIS: 125
OS_ADD: +2.50
OS_CYLINDER: -1.00
OD_AXIS: 145
OS_SPHERE: +1.50
OD_CYLINDER: -0.75
OU_VA: 20/25
OD_SPHERE: +1.50
OD_VA1: 20/25
OS_VA1: 20/25
OD_ADD: +2.50

## 2024-10-10 ASSESSMENT — REFRACTION_AUTOREFRACTION
OS_AXIS: 141
OD_AXIS: 144
OD_SPHERE: +1.00
OD_CYLINDER: -0.50
OS_CYLINDER: -0.75
OS_SPHERE: +0.50

## 2024-10-10 ASSESSMENT — KERATOMETRY
OS_K2POWER_DIOPTERS: 44.75
OD_AXISANGLE_DEGREES: 058
OS_AXISANGLE_DEGREES: 062
OD_K2POWER_DIOPTERS: 44.25
OS_K1POWER_DIOPTERS: 44.25
OD_K1POWER_DIOPTERS: 43.25

## 2024-10-10 ASSESSMENT — SUPERFICIAL PUNCTATE KERATITIS (SPK)
OS_SPK: 2+
OD_SPK: 2+

## 2024-10-10 ASSESSMENT — CONFRONTATIONAL VISUAL FIELD TEST (CVF)
OS_FINDINGS: FULL
OD_FINDINGS: FULL

## 2024-10-10 ASSESSMENT — TONOMETRY
OS_IOP_MMHG: 15
OD_IOP_MMHG: 15

## 2024-10-10 ASSESSMENT — VISUAL ACUITY
OS_BCVA: 20/30-1
OD_BCVA: 20/40-1

## 2024-10-10 ASSESSMENT — LID EXAM ASSESSMENTS
OD_TRICHIASIS: RUL 1+
OS_TRICHIASIS: LUL 1+

## 2024-11-04 ENCOUNTER — ASC (OUTPATIENT)
Dept: URBAN - METROPOLITAN AREA SURGERY 8 | Facility: SURGERY | Age: 84
Setting detail: OPHTHALMOLOGY
End: 2024-11-04
Payer: MEDICARE

## 2024-11-04 DIAGNOSIS — H02.421: ICD-10-CM

## 2024-11-04 DIAGNOSIS — H04.422: ICD-10-CM

## 2024-11-04 DIAGNOSIS — H02.524: ICD-10-CM

## 2024-11-04 DIAGNOSIS — H02.521: ICD-10-CM

## 2024-11-04 PROCEDURE — 67900 REPAIR BROW DEFECT: CPT | Mod: 50 | Performed by: OPHTHALMOLOGY

## 2024-11-04 PROCEDURE — 67904 REPAIR EYELID DEFECT: CPT | Mod: 50 | Performed by: OPHTHALMOLOGY

## 2024-11-05 ENCOUNTER — RX ONLY (RX ONLY)
Age: 84
End: 2024-11-05

## 2024-11-05 ENCOUNTER — OFFICE (OUTPATIENT)
Dept: URBAN - METROPOLITAN AREA CLINIC 116 | Facility: CLINIC | Age: 84
Setting detail: OPHTHALMOLOGY
End: 2024-11-05
Payer: MEDICARE

## 2024-11-05 DIAGNOSIS — H02.422: ICD-10-CM

## 2024-11-05 DIAGNOSIS — H02.831: ICD-10-CM

## 2024-11-05 DIAGNOSIS — H02.834: ICD-10-CM

## 2024-11-05 DIAGNOSIS — H02.421: ICD-10-CM

## 2024-11-05 DIAGNOSIS — H02.521: ICD-10-CM

## 2024-11-05 DIAGNOSIS — H02.524: ICD-10-CM

## 2024-11-05 PROCEDURE — 99024 POSTOP FOLLOW-UP VISIT: CPT | Performed by: PHYSICIAN ASSISTANT

## 2024-11-05 ASSESSMENT — REFRACTION_MANIFEST
OD_VA1: 20/25
OD_SPHERE: +1.50
OU_VA: 20/25
OS_AXIS: 125
OD_CYLINDER: -0.75
OS_ADD: +2.50
OS_VA1: 20/25
OS_CYLINDER: -1.00
OD_ADD: +2.50
OD_AXIS: 145
OS_SPHERE: +1.50

## 2024-11-05 ASSESSMENT — SUPERFICIAL PUNCTATE KERATITIS (SPK)
OS_SPK: 2+
OD_SPK: 2+

## 2024-11-05 ASSESSMENT — VISUAL ACUITY
OD_BCVA: 20/60
OS_BCVA: 20/60

## 2024-11-05 ASSESSMENT — REFRACTION_AUTOREFRACTION
OS_SPHERE: +0.50
OS_AXIS: 141
OS_CYLINDER: -0.75
OD_AXIS: 144
OD_CYLINDER: -0.50
OD_SPHERE: +1.00

## 2024-11-05 ASSESSMENT — REFRACTION_CURRENTRX
OD_CYLINDER: -0.75
OD_VPRISM_DIRECTION: SV
OD_AXIS: 135
OS_CYLINDER: -0.75
OS_OVR_VA: 20/
OS_VPRISM_DIRECTION: SV
OS_SPHERE: +3.50
OD_SPHERE: +3.50
OD_OVR_VA: 20/
OS_AXIS: 111

## 2024-11-05 ASSESSMENT — KERATOMETRY
OS_AXISANGLE_DEGREES: 062
OD_K2POWER_DIOPTERS: 44.25
OS_K1POWER_DIOPTERS: 44.25
OD_K1POWER_DIOPTERS: 43.25
OD_AXISANGLE_DEGREES: 058
OS_K2POWER_DIOPTERS: 44.75

## 2024-11-05 ASSESSMENT — LID EXAM ASSESSMENTS
OD_TRICHIASIS: RUL 1+
OS_TRICHIASIS: LUL 1+

## 2024-11-05 ASSESSMENT — CONFRONTATIONAL VISUAL FIELD TEST (CVF)
OD_FINDINGS: FULL
OS_FINDINGS: FULL

## 2024-12-12 ENCOUNTER — OFFICE (OUTPATIENT)
Dept: URBAN - METROPOLITAN AREA CLINIC 63 | Facility: CLINIC | Age: 84
Setting detail: OPHTHALMOLOGY
End: 2024-12-12
Payer: MEDICARE

## 2024-12-12 DIAGNOSIS — H02.524: ICD-10-CM

## 2024-12-12 DIAGNOSIS — H02.422: ICD-10-CM

## 2024-12-12 DIAGNOSIS — H02.834: ICD-10-CM

## 2024-12-12 DIAGNOSIS — H02.831: ICD-10-CM

## 2024-12-12 DIAGNOSIS — H02.421: ICD-10-CM

## 2024-12-12 DIAGNOSIS — H02.521: ICD-10-CM

## 2024-12-12 PROCEDURE — 99024 POSTOP FOLLOW-UP VISIT: CPT | Performed by: OPHTHALMOLOGY

## 2024-12-12 ASSESSMENT — REFRACTION_MANIFEST
OS_VA1: 20/25
OD_VA1: 20/25
OS_CYLINDER: -1.00
OS_ADD: +2.50
OU_VA: 20/25
OD_ADD: +2.50
OS_AXIS: 125
OS_SPHERE: +1.50
OD_CYLINDER: -0.75
OD_SPHERE: +1.50
OD_AXIS: 145

## 2024-12-12 ASSESSMENT — TONOMETRY
OS_IOP_MMHG: 16
OD_IOP_MMHG: 16

## 2024-12-12 ASSESSMENT — CONFRONTATIONAL VISUAL FIELD TEST (CVF)
OS_FINDINGS: FULL
OD_FINDINGS: FULL

## 2024-12-12 ASSESSMENT — REFRACTION_AUTOREFRACTION
OD_CYLINDER: -0.50
OD_SPHERE: +1.00
OS_CYLINDER: -0.75
OS_AXIS: 141
OS_SPHERE: +0.50
OD_AXIS: 144

## 2024-12-12 ASSESSMENT — REFRACTION_CURRENTRX
OS_VPRISM_DIRECTION: SV
OD_CYLINDER: -0.75
OD_VPRISM_DIRECTION: SV
OD_SPHERE: +3.50
OD_OVR_VA: 20/
OS_SPHERE: +3.50
OS_CYLINDER: -0.75
OS_AXIS: 111
OS_OVR_VA: 20/
OD_AXIS: 135

## 2024-12-12 ASSESSMENT — KERATOMETRY
OS_K1POWER_DIOPTERS: 44.25
OD_AXISANGLE_DEGREES: 058
OD_K1POWER_DIOPTERS: 43.25
OS_K2POWER_DIOPTERS: 44.75
OD_K2POWER_DIOPTERS: 44.25
OS_AXISANGLE_DEGREES: 062

## 2024-12-12 ASSESSMENT — SUPERFICIAL PUNCTATE KERATITIS (SPK)
OD_SPK: 2+
OS_SPK: 2+

## 2024-12-12 ASSESSMENT — LID EXAM ASSESSMENTS
OD_TRICHIASIS: RUL 1+
OS_TRICHIASIS: LUL 1+

## 2024-12-12 ASSESSMENT — VISUAL ACUITY
OD_BCVA: 20/40-1
OS_BCVA: 20/40

## 2025-01-09 ENCOUNTER — OFFICE (OUTPATIENT)
Dept: URBAN - METROPOLITAN AREA CLINIC 63 | Facility: CLINIC | Age: 85
Setting detail: OPHTHALMOLOGY
End: 2025-01-09
Payer: MEDICARE

## 2025-01-09 DIAGNOSIS — H16.223: ICD-10-CM

## 2025-01-09 DIAGNOSIS — H02.521: ICD-10-CM

## 2025-01-09 DIAGNOSIS — H02.524: ICD-10-CM

## 2025-01-09 DIAGNOSIS — H02.831: ICD-10-CM

## 2025-01-09 DIAGNOSIS — H02.423: ICD-10-CM

## 2025-01-09 PROCEDURE — 99024 POSTOP FOLLOW-UP VISIT: CPT | Mod: 24 | Performed by: OPHTHALMOLOGY

## 2025-01-09 PROCEDURE — 83861 MICROFLUID ANALY TEARS: CPT | Performed by: OPHTHALMOLOGY

## 2025-01-09 ASSESSMENT — REFRACTION_AUTOREFRACTION
OD_SPHERE: +1.00
OS_SPHERE: +0.50
OD_CYLINDER: -0.50
OS_AXIS: 141
OD_AXIS: 144
OS_CYLINDER: -0.75

## 2025-01-09 ASSESSMENT — REFRACTION_MANIFEST
OD_SPHERE: +1.50
OS_SPHERE: +1.50
OD_ADD: +2.50
OS_AXIS: 125
OS_ADD: +2.50
OU_VA: 20/25
OD_VA1: 20/25
OS_CYLINDER: -1.00
OD_AXIS: 145
OS_VA1: 20/25
OD_CYLINDER: -0.75

## 2025-01-09 ASSESSMENT — KERATOMETRY
OD_AXISANGLE_DEGREES: 058
OS_K2POWER_DIOPTERS: 44.75
OD_K2POWER_DIOPTERS: 44.25
OS_K1POWER_DIOPTERS: 44.25
OD_K1POWER_DIOPTERS: 43.25
OS_AXISANGLE_DEGREES: 062

## 2025-01-09 ASSESSMENT — REFRACTION_CURRENTRX
OS_VPRISM_DIRECTION: SV
OS_OVR_VA: 20/
OD_VPRISM_DIRECTION: SV
OS_SPHERE: +3.50
OD_AXIS: 135
OS_CYLINDER: -0.75
OD_OVR_VA: 20/
OD_SPHERE: +3.50
OD_CYLINDER: -0.75
OS_AXIS: 111

## 2025-01-09 ASSESSMENT — SUPERFICIAL PUNCTATE KERATITIS (SPK)
OS_SPK: 2+
OD_SPK: 2+

## 2025-01-09 ASSESSMENT — CONFRONTATIONAL VISUAL FIELD TEST (CVF)
OS_FINDINGS: FULL
OD_FINDINGS: FULL

## 2025-01-09 ASSESSMENT — VISUAL ACUITY
OD_BCVA: 20/50
OS_BCVA: 20/50

## 2025-01-09 ASSESSMENT — TONOMETRY
OS_IOP_MMHG: 19
OD_IOP_MMHG: 21

## 2025-01-09 ASSESSMENT — LID EXAM ASSESSMENTS
OS_TRICHIASIS: LUL 1+
OD_TRICHIASIS: RUL 1+

## 2025-02-13 ENCOUNTER — OFFICE (OUTPATIENT)
Dept: URBAN - METROPOLITAN AREA CLINIC 63 | Facility: CLINIC | Age: 85
Setting detail: OPHTHALMOLOGY
End: 2025-02-13
Payer: MEDICARE

## 2025-02-13 DIAGNOSIS — H02.521: ICD-10-CM

## 2025-02-13 DIAGNOSIS — H02.423: ICD-10-CM

## 2025-02-13 DIAGNOSIS — H02.831: ICD-10-CM

## 2025-02-13 DIAGNOSIS — H02.524: ICD-10-CM

## 2025-02-13 PROCEDURE — 92012 INTRM OPH EXAM EST PATIENT: CPT | Performed by: OPHTHALMOLOGY

## 2025-02-13 ASSESSMENT — REFRACTION_CURRENTRX
OD_VPRISM_DIRECTION: SV
OD_CYLINDER: -0.75
OS_SPHERE: +3.50
OD_OVR_VA: 20/
OS_CYLINDER: -0.75
OD_SPHERE: +3.50
OD_AXIS: 135
OS_VPRISM_DIRECTION: SV
OS_AXIS: 111
OS_OVR_VA: 20/

## 2025-02-13 ASSESSMENT — KERATOMETRY
OS_K1POWER_DIOPTERS: 44.25
OD_AXISANGLE_DEGREES: 058
OD_K2POWER_DIOPTERS: 44.25
OD_K1POWER_DIOPTERS: 43.25
OS_K2POWER_DIOPTERS: 44.75
OS_AXISANGLE_DEGREES: 062

## 2025-02-13 ASSESSMENT — REFRACTION_MANIFEST
OS_ADD: +2.50
OS_AXIS: 125
OS_VA1: 20/25
OD_CYLINDER: -0.75
OS_CYLINDER: -1.00
OD_SPHERE: +1.50
OD_AXIS: 145
OD_VA1: 20/25
OU_VA: 20/25
OS_SPHERE: +1.50
OD_ADD: +2.50

## 2025-02-13 ASSESSMENT — VISUAL ACUITY
OS_BCVA: 20/40
OD_BCVA: 20/40

## 2025-02-13 ASSESSMENT — REFRACTION_AUTOREFRACTION
OS_AXIS: 141
OD_AXIS: 144
OD_CYLINDER: -0.50
OD_SPHERE: +1.00
OS_SPHERE: +0.50
OS_CYLINDER: -0.75

## 2025-02-13 ASSESSMENT — LID EXAM ASSESSMENTS
OD_TRICHIASIS: RUL 1+
OS_TRICHIASIS: LUL 1+

## 2025-02-13 ASSESSMENT — CONFRONTATIONAL VISUAL FIELD TEST (CVF)
OS_FINDINGS: FULL
OD_FINDINGS: FULL

## 2025-02-13 ASSESSMENT — SUPERFICIAL PUNCTATE KERATITIS (SPK)
OD_SPK: 2+
OS_SPK: 2+

## 2025-03-15 ENCOUNTER — OFFICE (OUTPATIENT)
Dept: URBAN - METROPOLITAN AREA CLINIC 63 | Facility: CLINIC | Age: 85
Setting detail: OPHTHALMOLOGY
End: 2025-03-15
Payer: MEDICARE

## 2025-03-15 DIAGNOSIS — H02.011: ICD-10-CM

## 2025-03-15 DIAGNOSIS — H35.372: ICD-10-CM

## 2025-03-15 DIAGNOSIS — H02.014: ICD-10-CM

## 2025-03-15 DIAGNOSIS — H16.223: ICD-10-CM

## 2025-03-15 PROCEDURE — 92012 INTRM OPH EXAM EST PATIENT: CPT | Mod: 25 | Performed by: STUDENT IN AN ORGANIZED HEALTH CARE EDUCATION/TRAINING PROGRAM

## 2025-03-15 PROCEDURE — 92134 CPTRZ OPH DX IMG PST SGM RTA: CPT | Performed by: STUDENT IN AN ORGANIZED HEALTH CARE EDUCATION/TRAINING PROGRAM

## 2025-03-15 PROCEDURE — 67820 REVISE EYELASHES: CPT | Mod: E1,E3 | Performed by: STUDENT IN AN ORGANIZED HEALTH CARE EDUCATION/TRAINING PROGRAM

## 2025-03-15 ASSESSMENT — SUPERFICIAL PUNCTATE KERATITIS (SPK)
OD_SPK: 2+
OS_SPK: 2+

## 2025-03-15 ASSESSMENT — CONFRONTATIONAL VISUAL FIELD TEST (CVF)
OS_FINDINGS: FULL
OD_FINDINGS: FULL

## 2025-03-15 ASSESSMENT — LID EXAM ASSESSMENTS
OD_TRICHIASIS: RUL 1+
OS_TRICHIASIS: LUL 1+

## 2025-03-15 ASSESSMENT — TONOMETRY
OS_IOP_MMHG: 21
OD_IOP_MMHG: 21

## 2025-03-18 ASSESSMENT — REFRACTION_AUTOREFRACTION
OD_AXIS: 160
OD_CYLINDER: -0.50
OS_CYLINDER: -1.00
OD_SPHERE: +1.00
OS_AXIS: 124
OS_SPHERE: +1.00

## 2025-03-18 ASSESSMENT — REFRACTION_MANIFEST
OU_VA: 20/25
OD_ADD: +2.50
OD_SPHERE: +1.50
OD_VA1: 20/25
OS_AXIS: 125
OD_AXIS: 145
OS_SPHERE: +1.50
OS_VA1: 20/25
OD_CYLINDER: -0.75
OS_ADD: +2.50
OS_CYLINDER: -1.00

## 2025-03-18 ASSESSMENT — REFRACTION_CURRENTRX
OD_SPHERE: +3.50
OD_OVR_VA: 20/
OS_AXIS: 111
OS_SPHERE: +3.50
OD_CYLINDER: -0.75
OS_OVR_VA: 20/
OS_CYLINDER: -0.75
OS_VPRISM_DIRECTION: SV
OD_VPRISM_DIRECTION: SV
OD_AXIS: 135

## 2025-03-18 ASSESSMENT — KERATOMETRY
OS_K2POWER_DIOPTERS: 45.00
OD_K1POWER_DIOPTERS: 43.50
OD_K2POWER_DIOPTERS: 44.50
OS_AXISANGLE_DEGREES: 048
OS_K1POWER_DIOPTERS: 44.25
OD_AXISANGLE_DEGREES: 080

## 2025-03-18 ASSESSMENT — VISUAL ACUITY
OS_BCVA: 20/80
OD_BCVA: 20/60